# Patient Record
Sex: FEMALE | Race: WHITE | NOT HISPANIC OR LATINO | Employment: OTHER | URBAN - METROPOLITAN AREA
[De-identification: names, ages, dates, MRNs, and addresses within clinical notes are randomized per-mention and may not be internally consistent; named-entity substitution may affect disease eponyms.]

---

## 2019-04-11 ENCOUNTER — TRANSCRIBE ORDERS (OUTPATIENT)
Dept: NEUROSURGERY | Facility: CLINIC | Age: 59
End: 2019-04-11

## 2019-04-11 DIAGNOSIS — M54.50 LOWER BACK PAIN: Primary | ICD-10-CM

## 2019-04-18 ENCOUNTER — OFFICE VISIT (OUTPATIENT)
Dept: NEUROSURGERY | Facility: CLINIC | Age: 59
End: 2019-04-18
Payer: COMMERCIAL

## 2019-04-18 VITALS
SYSTOLIC BLOOD PRESSURE: 163 MMHG | HEART RATE: 84 BPM | BODY MASS INDEX: 25.91 KG/M2 | RESPIRATION RATE: 19 BRPM | HEIGHT: 68 IN | WEIGHT: 171 LBS | TEMPERATURE: 98 F | DIASTOLIC BLOOD PRESSURE: 94 MMHG

## 2019-04-18 DIAGNOSIS — M51.36 DEGENERATION OF INTERVERTEBRAL DISC OF LUMBAR REGION: Primary | ICD-10-CM

## 2019-04-18 DIAGNOSIS — M54.50 LOWER BACK PAIN: ICD-10-CM

## 2019-04-18 PROCEDURE — 99203 OFFICE O/P NEW LOW 30 MIN: CPT | Performed by: PHYSICIAN ASSISTANT

## 2019-04-18 RX ORDER — MULTIVIT WITH MINERALS/LUTEIN
TABLET ORAL DAILY
COMMUNITY

## 2019-04-18 RX ORDER — GABAPENTIN 300 MG/1
2 CAPSULE ORAL 3 TIMES DAILY PRN
COMMUNITY
Start: 2019-02-12

## 2019-04-18 RX ORDER — TIZANIDINE 4 MG/1
4 TABLET ORAL 2 TIMES DAILY
COMMUNITY
Start: 2019-03-25

## 2019-04-18 RX ORDER — NABUMETONE 500 MG/1
500 TABLET, FILM COATED ORAL
COMMUNITY
Start: 2019-03-07 | End: 2020-01-02 | Stop reason: ALTCHOICE

## 2019-04-18 RX ORDER — MONTELUKAST SODIUM 10 MG/1
10 TABLET ORAL
COMMUNITY
Start: 2018-09-13

## 2019-04-18 RX ORDER — ALBUTEROL SULFATE 2.5 MG/3ML
2.5 SOLUTION RESPIRATORY (INHALATION) 2 TIMES DAILY
COMMUNITY
Start: 2018-04-04

## 2019-04-18 RX ORDER — ESOMEPRAZOLE MAGNESIUM 40 MG/1
40 FOR SUSPENSION ORAL
COMMUNITY

## 2019-04-22 ENCOUNTER — TELEPHONE (OUTPATIENT)
Dept: OBGYN CLINIC | Facility: HOSPITAL | Age: 59
End: 2019-04-22

## 2019-07-03 ENCOUNTER — TRANSCRIBE ORDERS (OUTPATIENT)
Dept: PAIN MEDICINE | Facility: CLINIC | Age: 59
End: 2019-07-03

## 2019-07-03 ENCOUNTER — CONSULT (OUTPATIENT)
Dept: PAIN MEDICINE | Facility: CLINIC | Age: 59
End: 2019-07-03
Payer: COMMERCIAL

## 2019-07-03 VITALS
HEART RATE: 82 BPM | SYSTOLIC BLOOD PRESSURE: 152 MMHG | DIASTOLIC BLOOD PRESSURE: 74 MMHG | TEMPERATURE: 98.3 F | BODY MASS INDEX: 26.15 KG/M2 | WEIGHT: 172 LBS

## 2019-07-03 DIAGNOSIS — M51.16 INTERVERTEBRAL DISC DISORDER WITH RADICULOPATHY OF LUMBAR REGION: ICD-10-CM

## 2019-07-03 DIAGNOSIS — G89.4 CHRONIC PAIN SYNDROME: Primary | ICD-10-CM

## 2019-07-03 PROBLEM — F11.20 OPIOID DEPENDENCE, UNCOMPLICATED (HCC): Status: ACTIVE | Noted: 2017-08-21

## 2019-07-03 PROBLEM — F17.200 TOBACCO USE DISORDER: Status: ACTIVE | Noted: 2017-08-21

## 2019-07-03 PROBLEM — N18.30 KIDNEY DISEASE, CHRONIC, STAGE III (GFR 30-59 ML/MIN) (HCC): Status: ACTIVE | Noted: 2018-04-10

## 2019-07-03 PROBLEM — K21.9 GASTROESOPHAGEAL REFLUX DISEASE WITHOUT ESOPHAGITIS: Status: ACTIVE | Noted: 2017-08-21

## 2019-07-03 PROBLEM — J44.9 COPD, GROUP C, BY GOLD 2017 CLASSIFICATION (HCC): Status: ACTIVE | Noted: 2019-06-10

## 2019-07-03 PROCEDURE — 99204 OFFICE O/P NEW MOD 45 MIN: CPT | Performed by: ANESTHESIOLOGY

## 2019-07-03 NOTE — PROGRESS NOTES
Assessment  1  Chronic pain syndrome    2  Intervertebral disc disorder with radiculopathy of lumbar region        Plan  The patient's symptoms, history/physical are consistent with pain that is multifactorial in origin but predominantly result of a chronic pain syndrome secondary to her underlying lumbar disc bulges which are leading to radicular symptoms  At this time, I discussed spinal cord stimulation to treat her symptoms since she has exhausted all other nonsurgical treatment options  I explained how stimulation provides relief of lower back and leg symptoms by stimulating the dorsal columns  Initially a trial would be performed where a spinal cord stimulator lead would be placed percutaneously through an epidural needle  The patient would then have 3-6 days to evaluate whether the stimulator provides relief of the painful areas  If the patient gets significant relief of greater than 50% during the trial, the patient would then be referred for permanent implantation  In order to do the trial, the patient will be referred for psychological evaluation prior to obtaining insurance preauthorization  The patient will also be set up for education with our [employer] device representative  My impressions and treatment recommendations were discussed in detail with the patient who verbalized understanding and had no further questions  Discharge instructions were provided  I personally saw and examined the patient and I agree with the above discussed plan of care  Orders Placed This Encounter   Procedures    MRI thoracic spine wo contrast     Standing Status:   Future     Standing Expiration Date:   7/3/2023     Scheduling Instructions: There is no preparation for this test  Please leave your jewelry and valuables at home, wedding rings are the exception  Please bring your physician order, insurance cards, a form of photo ID and a list of your medications with you   Arrive 15 minutes prior to your appointment time in order to       register  Please bring any prior CT or MRI studies of this area that were not performed at a [facility]  To schedule this appointment, please contact Central Scheduling at [phone]  Order Specific Question:   What is the patient's sedation requirement? Answer:   No Sedation     Order Specific Question:   Does the patient have metallic implants? Answer:   No     Order Specific Question:   Is the patient pregnant? Answer:   No     No orders of the defined types were placed in this encounter  History of Present Illness    Asha Sloan is a 62 y o  female who presents for consultation in regards to lower back and right-sided buttock and leg pain  Symptoms have been present for 13 years without any precipitating injury or trauma  Pain is severe rated 8/10 on numeric rating scale and felt constantly  Symptoms are described to be dull, aching, shooting and burning in the lower back and travels down the right leg  She feels weakness of the leg  Symptoms are aggravated lying down, bending, sitting, exercise, relaxation  There is no change with coughing, sneezing  Treatment history has included numerous prior injections, physical therapy, exercise which have provided no relief  Heat/ice and use of a TENS unit provides moderate relief  She uses Tylenol as needed which provides minimal relief  I have personally reviewed and/or updated the patient's past medical history, past surgical history, family history, social history, current medications, allergies, and vital signs today  Review of Systems   Constitutional: Positive for unexpected weight change  Negative for fever  HENT: Positive for hearing loss  Negative for trouble swallowing  Eyes: Negative for visual disturbance  Respiratory: Positive for cough, shortness of breath and wheezing  Cardiovascular: Positive for palpitations  Negative for chest pain  Gastrointestinal: Negative for constipation, diarrhea, nausea and vomiting  Endocrine: Negative for cold intolerance, heat intolerance and polydipsia  Genitourinary: Positive for frequency  Negative for difficulty urinating  Musculoskeletal: Positive for joint swelling  Negative for arthralgias, gait problem and myalgias  Skin: Negative for rash  Neurological: Positive for numbness and headaches  Negative for dizziness, seizures, syncope and weakness  Hematological: Does not bruise/bleed easily  Psychiatric/Behavioral: Negative for dysphoric mood  All other systems reviewed and are negative        Patient Active Problem List   Diagnosis    COPD, group C, by GOLD 2017 classification ([facility] [address] )    Gastroesophageal reflux disease without esophagitis    Kidney disease, chronic, stage III (GFR 30-59 ml/min) ([facility])    Opioid dependence, uncomplicated ([facility])    Tobacco use disorder       Past Medical History:   Diagnosis Date    Asthma     COPD exacerbation ([facility])     GERD (gastroesophageal reflux disease)     Hiatal hernia     Kidney disease     Lumbosacral disc disease     Osteoarthritis of cervical and lumbar spine     Reactive airway disease     Tobacco use disorder        Past Surgical History:   Procedure Laterality Date    CHOLECYSTECTOMY      SIGMOIDOSCOPY      TONSILLECTOMY AND ADENOIDECTOMY         Family History   Problem Relation Age of Onset    Cancer Father     Heart disease Father     Ovarian cancer Sister     Diabetes Maternal Grandfather        Social History     Occupational History    Not on file   Tobacco Use    Smoking status: Current Some Day Smoker    Smokeless tobacco: Never Used   Substance and Sexual Activity    Alcohol use: Not Currently    Drug use: Not on file    Sexual activity: Not on file       Current Outpatient Medications on File Prior to Visit   Medication Sig    albuterol (2 5 mg/3 mL) 0 083 % nebulizer solution Inhale 2 5 mg    Ascorbic Acid (VITAMIN C) 1000 MG tablet Take by mouth    Buprenorphine HCl-Naloxone HCl (BUNAVAIL) 4 2-0 7 MG FILM TK 1 FILM BUCCALLY BID    esomeprazole (NEXIUM) 40 mg packet Take 40 mg by mouth    Fish Oil-Cholecalciferol (FISH OIL + D3) 4312-4842 MG-UNIT CAPS Take by mouth    fluticasone-salmeterol (ADVAIR DISKUS) 250-50 mcg/dose inhaler Inhale 1 puff    gabapentin (NEURONTIN) 300 mg capsule Take 2 capsules by mouth 3 (three) times a day    montelukast (SINGULAIR) 10 mg tablet Take 10 mg by mouth    nabumetone (RELAFEN) 500 mg tablet Take 500 mg by mouth    polyethylene glycol-propylene glycol (SYSTANE) 0 4-0 3 % Apply 0 05 mL to eye    tiZANidine (ZANAFLEX) 4 mg tablet Take 4 mg by mouth     No current facility-administered medications on file prior to visit  Allergies   Allergen Reactions    Shellfish Allergy Vomiting    Lactose Intolerance (Gi)     Penicillins Itching       Physical Exam    /74   Pulse 82   Temp 98 3 °F (36 8 °C) (Oral)   Wt 78 kg (172 lb)   BMI 26 15 kg/m²     Constitutional: normal, well developed, well nourished, alert, in no distress and non-toxic and no overt pain behavior  Eyes: anicteric  HEENT: grossly intact  Neck: supple, symmetric, trachea midline and no masses   Pulmonary:even and unlabored  Cardiovascular:No edema or pitting edema present  Skin:Normal without rashes or lesions and well hydrated  Psychiatric:Mood and affect appropriate  Neurologic:Cranial Nerves II-XII grossly intact  Musculoskeletal:antalgic     Lumbar Spine Exam  Appearance:  Normal lordosis  Palpation/Tenderness:  right lumbar paraspinal tenderness  right sacroiliac joint tenderness  right piriformis tenderness  Sensory:  no sensory deficits noted  Range of Motion:  Flexion:   Moderately limited  with pain  Extension:  Moderately limited  with pain  Lateral Flexion - Left:  No limitation  without pain  Lateral Flexion - Right:  Moderately limited  with pain  Rotation - Left:  No limitation without pain  Rotation - Right:  Moderately limited  with pain  Motor Strength:  Left hip flexion:  5/5  Left hip extension:  5/5  Right hip flexion:  5/5  Right hip extension:  5/5  Left knee flexion:  5/5  Left knee extension:  5/5  Right knee flexion:  5/5  Right knee extension:  5/5  Left foot dorsiflexion:  5/5  Left foot plantar flexion:  5/5  Right foot dorsiflexion:  5/5  Right foot plantar flexion:  5/5  Reflexes:  Left Patellar:  2+   Right Patellar:  2+   Left Achilles:  2+   Right Achilles:  2+   Special Tests:  Left Straight Leg Test:  negative  Right Straight Leg Test:  positive      Imaging    MRI L SPINE WO CONTRAST-12/24/2018    HISTORY  Radiculopathy    COMPARISON  MRI of the lumbar spine 08/20/2013    TECHNIQUE  Multiplanar, multisequence magnetic resonance imaging of the lumbar spine was performed without intravenous contrast     FINDINGS  There are 5 lumbar type vertebrae   Correlation with this numbering scheme is recommended prior to any planned surgical intervention   Lumbar vertebrae appear maintained in height   There is minimal retrolisthesis of L1 on L2 and L2 on L3   Mild leftward curvature is present   Multilevel loss of disc height and disc desiccation is demonstrated   Reactive marrow changes are most pronounced at L2-3   The distal spinal cord is unremarkable  T12-L1: No significant canal or foraminal narrowing  L1-L2: A broad-based disc bulge and mild facet arthropathy contribute to mild canal stenosis and mild bilateral foraminal narrowing  L2-L3: A broad-based disc bulge and facet arthropathy contribute to mild/moderate canal stenosis   There is mild/moderate left foraminal narrowing and moderate right foraminal narrowing  L3-L4: A broad-based disc bulge and facet arthropathy contribute to mild/moderate canal stenosis and moderate bilateral foraminal narrowing the left greater than right    L4-L5: A broad-based disc bulge and facet arthropathy contribute to mild/moderate canal stenosis, moderate/severe right foraminal narrowing and severe left foraminal narrowing  L5-S1: A broad-based disc bulge and facet arthropathy are present without significant canal stenosis   There is moderate/severe bilateral foraminal narrowing

## 2019-07-08 ENCOUNTER — TELEPHONE (OUTPATIENT)
Dept: PAIN MEDICINE | Facility: CLINIC | Age: 59
End: 2019-07-08

## 2019-07-08 DIAGNOSIS — G89.4 CHRONIC PAIN SYNDROME: Primary | ICD-10-CM

## 2019-07-08 NOTE — TELEPHONE ENCOUNTER
Pt to be a [employer] SCS Trial with Dr July Caldwell  Pt signed release of info  Pt received scripts for thoracic MRI and psych eval  Email sent to [employer] to educate patient  Once I have received psych eval I will start insurance auth process

## 2019-07-17 NOTE — TELEPHONE ENCOUNTER
Pt  Called stating she needs a referral to [facility] physiatrist       They would like this referral faxed to :  [phone]   For the srini conti

## 2019-07-18 NOTE — TELEPHONE ENCOUNTER
Script for psych eval faxed to [facility] ([phone]) as requested

## 2019-07-18 NOTE — TELEPHONE ENCOUNTER
Reviewed chart, could not locate Referral for Psych eval referenced 7/3/19 consult   Can referral be placed and it will be faxed

## 2019-07-18 NOTE — TELEPHONE ENCOUNTER
Dr Santosh Young, Can you please put order in for psych eval? There is not one in her chart   Thank you

## 2019-08-05 ENCOUNTER — TELEPHONE (OUTPATIENT)
Dept: PAIN MEDICINE | Facility: CLINIC | Age: 59
End: 2019-08-05

## 2019-08-05 NOTE — TELEPHONE ENCOUNTER
This message was sent in an Epic message in a stim message  Please forward records in regards to an injection as requested  Patient is calling to request our office to send notes about her pinched nerves to Dr Adam Pineda in [place]  Patient said that is her pain management doctor and that doctor can offer her an injection this Wednesday but he needs the notes first  Please advise?  [phone]

## 2019-08-05 NOTE — TELEPHONE ENCOUNTER
Patient is calling to request our office to send notes about her pinched nerves to Dr Kelly Carr in [place]  Patient said that is her pain management doctor and that doctor can offer her an injection this Wednesday but he needs the notes first  Please advise?  [phone]

## 2019-08-05 NOTE — TELEPHONE ENCOUNTER
The below message was forwarded to  [place] to address  This is not anything to do with the STIM

## 2019-08-05 NOTE — TELEPHONE ENCOUNTER
Spoke with pt who reports having psych eval done last week at Dr Nani Calles office by Ira Machado ([phone])  Advised patient that I have not received eval as of yet but that I would call and request eval be sent ASAP

## 2019-08-05 NOTE — TELEPHONE ENCOUNTER
Pt would call back from Han Vanessa   Pt would like to know if everything needed was received for the trial      Pt can be reached at [phone]

## 2019-08-06 ENCOUNTER — TELEPHONE (OUTPATIENT)
Dept: PAIN MEDICINE | Facility: CLINIC | Age: 59
End: 2019-08-06

## 2019-08-08 NOTE — TELEPHONE ENCOUNTER
Signed off psych eval and all clinical info faxed to [employer]  Spoke with pt and made her aware of status

## 2019-08-20 NOTE — TELEPHONE ENCOUNTER
Received authorization, Auth # [id number], valid 8/16/19-2/16/2020    Spoke with patient and she is scheduled as follows:  9/17/19 @ 1300 w/ [facility] for H&P  9/24/19 arriving @ 1000 for 1100 trial (pt aware to arrive @ 1000)  10/1/19 @ 1415 w/ Yadira Terry for lead removal  PCN allergy  Pt denies any blood thinning meds/NSAIDs  Email sent to [employer] to inform them of trial dates

## 2019-09-05 DIAGNOSIS — Z79.899 ENCOUNTER FOR LONG-TERM CURRENT USE OF MEDICATION: ICD-10-CM

## 2019-09-05 DIAGNOSIS — G89.4 CHRONIC PAIN SYNDROME: Primary | ICD-10-CM

## 2019-09-05 DIAGNOSIS — Z79.01 CHRONIC ANTICOAGULATION: ICD-10-CM

## 2019-09-05 DIAGNOSIS — M51.16 INTERVERTEBRAL DISC DISORDER WITH RADICULOPATHY OF LUMBAR REGION: ICD-10-CM

## 2019-09-17 ENCOUNTER — OFFICE VISIT (OUTPATIENT)
Dept: PAIN MEDICINE | Facility: CLINIC | Age: 59
End: 2019-09-17
Payer: COMMERCIAL

## 2019-09-17 VITALS
DIASTOLIC BLOOD PRESSURE: 82 MMHG | RESPIRATION RATE: 16 BRPM | HEIGHT: 68 IN | BODY MASS INDEX: 26.07 KG/M2 | SYSTOLIC BLOOD PRESSURE: 156 MMHG | WEIGHT: 172 LBS

## 2019-09-17 DIAGNOSIS — M51.16 INTERVERTEBRAL DISC DISORDERS WITH RADICULOPATHY, LUMBAR REGION: ICD-10-CM

## 2019-09-17 DIAGNOSIS — G89.4 CHRONIC PAIN SYNDROME: Primary | ICD-10-CM

## 2019-09-17 PROCEDURE — 99213 OFFICE O/P EST LOW 20 MIN: CPT | Performed by: NURSE PRACTITIONER

## 2019-09-17 RX ORDER — PREGABALIN 75 MG/1
75 CAPSULE ORAL 2 TIMES DAILY
COMMUNITY
Start: 2019-08-08

## 2019-09-17 NOTE — PROGRESS NOTES
Pt c/o lower back pain that radiates to the right hip and leg    Assessment:  No diagnosis found  Plan:  ***    {Oral Swab Statement:91031}    {Opioid Statement:29836}    {UDS Statement:31405}    {PDMP Statement:11199}    {Pain Management Procedure Statement:00813}    My impressions and treatment recommendations were discussed in detail with the patient who verbalized understanding and had no further questions  Discharge instructions were provided  I personally saw and examined the patient and I agree with the above discussed plan of care  No orders of the defined types were placed in this encounter  No orders of the defined types were placed in this encounter  History of Present Illness:  Yesica Day is a 61 y o  female who presents for a follow up office visit in regards to Back Pain (radaites to the right hip and leg); Hip Pain; and Leg Pain  The patients current symptoms include ***    {SPA Pain Assessment:87733}    {Opioid Contract DVKW:63928}    I have personally reviewed and/or updated the patient's past medical history, past surgical history, family history, social history, current medications, allergies, and vital signs today  Review of Systems   Respiratory: Negative for shortness of breath  Cardiovascular: Negative for chest pain  Gastrointestinal: Negative for constipation, diarrhea, nausea and vomiting  Musculoskeletal: Positive for gait problem  Negative for arthralgias, joint swelling and myalgias  Decreased ROM   Skin: Negative for rash  Neurological: Positive for weakness  Negative for dizziness and seizures  All other systems reviewed and are negative        Patient Active Problem List   Diagnosis    COPD, group C, by GOLD 2017 classification ([facility] [address] )    Gastroesophageal reflux disease without esophagitis    Kidney disease, chronic, stage III (GFR 30-59 ml/min) ([facility])    Opioid dependence, uncomplicated ([address] )    Tobacco use disorder       Past Medical History:   Diagnosis Date    Asthma     COPD exacerbation (HCC)     GERD (gastroesophageal reflux disease)     Hiatal hernia     Kidney disease     Lumbosacral disc disease     Osteoarthritis of cervical and lumbar spine     Reactive airway disease     Tobacco use disorder        Past Surgical History:   Procedure Laterality Date    CHOLECYSTECTOMY      SIGMOIDOSCOPY      TONSILLECTOMY AND ADENOIDECTOMY         Family History   Problem Relation Age of Onset    Cancer Father     Heart disease Father     Ovarian cancer Sister     Diabetes Maternal Grandfather        Social History     Occupational History    Not on file   Tobacco Use    Smoking status: Current Some Day Smoker    Smokeless tobacco: Never Used   Substance and Sexual Activity    Alcohol use: Not Currently    Drug use: Not on file    Sexual activity: Not on file       Current Outpatient Medications on File Prior to Visit   Medication Sig    albuterol (2 5 mg/3 mL) 0 083 % nebulizer solution Inhale 2 5 mg    Ascorbic Acid (VITAMIN C) 1000 MG tablet Take by mouth    Buprenorphine HCl-Naloxone HCl (BUNAVAIL) 4 2-0 7 MG FILM TK 1 FILM BUCCALLY BID    esomeprazole (NEXIUM) 40 mg packet Take 40 mg by mouth    Fish Oil-Cholecalciferol (FISH OIL + D3) 6137-4643 MG-UNIT CAPS Take by mouth    fluticasone-salmeterol (ADVAIR DISKUS) 250-50 mcg/dose inhaler Inhale 1 puff    gabapentin (NEURONTIN) 300 mg capsule Take 2 capsules by mouth 3 (three) times a day    montelukast (SINGULAIR) 10 mg tablet Take 10 mg by mouth    nabumetone (RELAFEN) 500 mg tablet Take 500 mg by mouth    polyethylene glycol-propylene glycol (SYSTANE) 0 4-0 3 % Apply 0 05 mL to eye    tiZANidine (ZANAFLEX) 4 mg tablet Take 4 mg by mouth     No current facility-administered medications on file prior to visit          Allergies   Allergen Reactions    Shellfish Allergy Vomiting    Lactose Intolerance (Gi)     Penicillins Itching       Physical Exam:    There were no vitals taken for this visit  Constitutional:{General Appearance:19200::"normal, well developed, well nourished, alert, in no distress and non-toxic and no overt pain behavior  "}  Eyes:{Sclera:50610::"anicteric"}  HEENT:{Hearin::"grossly intact"}  Neck:{Neck:57081::"supple, symmetric, trachea midline and no masses "}  Pulmonary:{Respiratory effort:52535::"even and unlabored"}  Cardiovascular:{Examination of Extremities:27915::"No edema or pitting edema present"}  Skin:{Skin and Subcutaneous tissues:30337::"Normal without rashes or lesions and well hydrated"}  Psychiatric:{Mood and Affect:62458::"Mood and affect appropriate"}  Neurologic:{Cranial Nerves:91171::"Cranial Nerves II-XII grossly intact"}  Musculoskeletal:{[place]:73186::"normal"}    Imaging

## 2019-09-17 NOTE — PROGRESS NOTES
Assessment:  1  Chronic pain syndrome     2  Intervertebral disc disorders with radiculopathy, lumbar region         Plan:  The spinal cord stimulator trial was discussed in depth with the patient  The patient was advised that a stimulator lead will be placed percutaneously through an epidural needle, with intra-op stimulation done to confirm appropriate lead placement  The lead will then be connected to an external generator and secured to the skin  The device representative will then program the stimulator and explain how to use it  During the trial, the patient was instructed to perform their activities of daily living, but limit twisting and bending motions, and no lifting greater than 10 pounds  The patient was advised to refrain from tub baths, showers, swimming, and hot tubs during the trial  The patient will return to the office for trial evaluation and lead removal on 10/01/2019  At that time, if the trial is successful, the patient will be referred to Neurosurgery for permanent spinal cord stimulator placement  Pre-procedure instructions were reviewed with the patient  The patient was given a prescription for blood work to be done by 09/17/2019  Complete risks and benefits including bleeding, infection, headache, tissue reaction, nerve injury and allergic reaction were discussed  The approach was demonstrated using models and literature was provided  Verbal and written consent was obtained  The patient will next follow- up on 09/24/2019 for the stimulator trial     History of Present Illness: The patient is a 61 y o  female scheduled for an [employer] spinal cord stimulator trial on 9/24/2019 to treat chronic pain from lumbar intervertebral disc disorder radiculopathy  The current pain pattern includes right-sided low back pain that radiates into her right buttocks and right hip  She denies bilateral leg weakness, or bowel or bladder issues    She describes her pain as dull aching, sharp, throbbing, shooting pain that is constant nature with symptoms worsening during the morning nighttime hours  The patient reports that her pain and symptoms are unchanged since last office visit  I have personally reviewed and/or updated the patient's past medical history, past surgical history, family history, social history, current medications, allergies, and vital signs today  Review of Systems  Review of Systems   Respiratory: Negative for shortness of breath  Cardiovascular: Negative for chest pain  Gastrointestinal: Negative for constipation, diarrhea, nausea and vomiting  Musculoskeletal: Positive for gait problem  Negative for arthralgias, joint swelling and myalgias  Decreased ROM  Joint stiffness   Skin: Negative for rash  Neurological: Positive for weakness  Negative for dizziness and seizures  All other systems reviewed and are negative           Past Medical History:   Diagnosis Date    Asthma     COPD exacerbation (HCC)     GERD (gastroesophageal reflux disease)     Hiatal hernia     Kidney disease     Lumbosacral disc disease     Osteoarthritis of cervical and lumbar spine     Reactive airway disease     Tobacco use disorder        Past Surgical History:   Procedure Laterality Date    CHOLECYSTECTOMY      SIGMOIDOSCOPY      TONSILLECTOMY AND ADENOIDECTOMY         Family History   Problem Relation Age of Onset    Cancer Father     Heart disease Father     Ovarian cancer Sister     Diabetes Maternal Grandfather        Social History     Occupational History    Not on file   Tobacco Use    Smoking status: Current Some Day Smoker    Smokeless tobacco: Never Used   Substance and Sexual Activity    Alcohol use: Not Currently    Drug use: Not on file    Sexual activity: Not on file         Current Outpatient Medications:     albuterol (2 5 mg/3 mL) 0 083 % nebulizer solution, Inhale 2 5 mg, Disp: , Rfl:     Ascorbic Acid (VITAMIN C) 1000 MG tablet, Take by mouth, Disp: , Rfl:     Buprenorphine HCl-Naloxone HCl (BUNAVAIL) 4 2-0 7 MG FILM, TK 1 FILM BUCCALLY BID, Disp: , Rfl:     esomeprazole (NEXIUM) 40 mg packet, Take 40 mg by mouth, Disp: , Rfl:     Fish Oil-Cholecalciferol (FISH OIL + D3) 8471-9108 MG-UNIT CAPS, Take by mouth, Disp: , Rfl:     fluticasone-salmeterol (ADVAIR DISKUS) 250-50 mcg/dose inhaler, Inhale 1 puff, Disp: , Rfl:     gabapentin (NEURONTIN) 300 mg capsule, Take 2 capsules by mouth 3 (three) times a day, Disp: , Rfl:     montelukast (SINGULAIR) 10 mg tablet, Take 10 mg by mouth, Disp: , Rfl:     nabumetone (RELAFEN) 500 mg tablet, Take 500 mg by mouth, Disp: , Rfl:     polyethylene glycol-propylene glycol (SYSTANE) 0 4-0 3 %, Apply 0 05 mL to eye, Disp: , Rfl:     pregabalin (LYRICA) 75 mg capsule, Take 1 cap at night for a week  Then take 1 cap twice daily as tolerated  , Disp: , Rfl:     tiZANidine (ZANAFLEX) 4 mg tablet, Take 4 mg by mouth, Disp: , Rfl:     Allergies   Allergen Reactions    Shellfish Allergy Vomiting    Lactose Intolerance (Gi)     Penicillins Itching       Physical Exam:    /82 (BP Location: Right arm, Patient Position: Sitting, Cuff Size: Standard)   Resp 16   Ht 5' 8" (1 727 m)   Wt 78 kg (172 lb)   BMI 26 15 kg/m²     Constitutional:normal, well developed, well nourished, alert, in no distress and non-toxic and no overt pain behavior  and overweight  Eyes:anicteric  HEENT:grossly intact  Neck:supple, symmetric, trachea midline and no masses   Pulmonary:even and unlabored the lungs clear to auscultation bilaterally  Cardiovascular:No edema or pitting edema present and Regular rate and rhythm, no murmurs rubs or gallops noted  Skin:Normal without rashes or lesions and well hydrated  Psychiatric:Mood and affect appropriate  Neurologic:Cranial Nerves II-XII grossly intact  Musculoskeletal:normal   Abdomen:   Bowel sounds present x4 quadrants no pain with palpation of the abdomen    Imaging      No orders of the defined types were placed in this encounter

## 2019-09-18 LAB — HBA1C MFR BLD HPLC: 5.3 %

## 2019-09-18 NOTE — TELEPHONE ENCOUNTER
FYI: patient will be having blood work done at [facility] today, 9/18/19

## 2019-09-20 ENCOUNTER — TELEPHONE (OUTPATIENT)
Dept: RADIOLOGY | Facility: CLINIC | Age: 59
End: 2019-09-20

## 2019-09-20 DIAGNOSIS — D72.828 OTHER ELEVATED WHITE BLOOD CELL (WBC) COUNT: Primary | ICD-10-CM

## 2019-09-20 NOTE — TELEPHONE ENCOUNTER
I called pt this AM to ask where she had her labs done for SCS next wk  Pt said [facility]  I obtained labs  Pt's WBC was 10 90, results shown to FQ  Pt was to be called and inquire if sick and ordered to have blood test repeated on Sunday  I s/w pt and she said she started with a cold yest, congestion and hoarseness  She said she was taking OTC med for it  I made FQ, SCS Trial to be cx'd  I informed pt of the same and explained it is not safe to perform procedure if pt has active infection  Pt upset  Pt will contact her PCP if OTC doesn't help  Pt told to contact the office once symptoms are gone and when off abx if one is started  Pt asking if it will take another month to get this scheduled, I told pt we will do our best to get her R/S once she is no longer sick  Email sent to [employer] and Elisabet Calvert

## 2019-09-23 RX ORDER — CLINDAMYCIN PHOSPHATE 900 MG/50ML
900 INJECTION INTRAVENOUS ONCE
Status: CANCELLED | OUTPATIENT
Start: 2019-09-23 | End: 2019-09-23

## 2019-09-23 NOTE — TELEPHONE ENCOUNTER
Patient's trial and lead removal were cancelled due to blood work being abnormal  Spoke with patient and she is re scheduled as follows:  10/22/19 arriving @ 1000 for 1100 trial  10/28/19 @ [phone] w/ Reba Valdez for lead removal  New pre op instruction sheet and script for CBC mailed to patient  Email sent to [employer] to inform them of new trial dates

## 2019-10-16 ENCOUNTER — TELEPHONE (OUTPATIENT)
Dept: PAIN MEDICINE | Facility: MEDICAL CENTER | Age: 59
End: 2019-10-16

## 2019-10-16 NOTE — TELEPHONE ENCOUNTER
Pt called stating that she needs to know if she needs to get the white blood cell count lab work done  Pt never received a call back regarding this   Pt is not sure if she can have the procedure done next week if she does not get a response    Pt can be reached at [phone]

## 2019-10-16 NOTE — TELEPHONE ENCOUNTER
Late entry:    S/w pt this morning around 10 am to ask if she repeated her lab work for her SCS trial on 10/22  Pt said she has not she has been putting it off because she had a tooth ache last week and ended up getting put on abx then from the abx she developed a UTI or yeast infection  Pt said she did not get it checked but has been using OTC meds but the burning has not improved  Told pt that I will discuss with FQ and call her back

## 2019-10-16 NOTE — TELEPHONE ENCOUNTER
Made pt aware of having to cancel, pt upset and wants to reschedule asap  [facility]

## 2019-10-17 NOTE — TELEPHONE ENCOUNTER
Trial and lead removal have been re scheduled  Email sent to Thayer Frankel from [facility] to inform him of dates   Pt will have new blood work done a week prior to 11/7/19

## 2019-10-22 ENCOUNTER — HOSPITAL ENCOUNTER (OUTPATIENT)
Dept: RADIOLOGY | Facility: CLINIC | Age: 59
Discharge: HOME | End: 2019-10-22

## 2019-11-04 ENCOUNTER — TELEPHONE (OUTPATIENT)
Dept: RADIOLOGY | Facility: CLINIC | Age: 59
End: 2019-11-04

## 2019-11-04 NOTE — TELEPHONE ENCOUNTER
I called [facility] this morning to inquire on any recent CBC for pt, was informed that the last test pt had done was a urine test     I called and s/w pt about going for repeat blood test before SCS trial on Thurs  Pt said she was just heading out the door to go get it done  I confirmed that pt has no current active infections  Will contact [facility] later today or tomorrow for CBC results    [facility] ph # [phone]

## 2019-11-04 NOTE — TELEPHONE ENCOUNTER
I called [facility] and labs from today are still pending  She will put note in system to fax results to [phone]     (Pt is for SCS Trial this Thurs 11/7, FQ needs to review results, last WBC was elevated )

## 2019-11-05 ENCOUNTER — TRANSCRIBE ORDERS (OUTPATIENT)
Dept: PAIN MEDICINE | Facility: CLINIC | Age: 59
End: 2019-11-05

## 2019-11-07 ENCOUNTER — HOSPITAL ENCOUNTER (OUTPATIENT)
Dept: RADIOLOGY | Facility: CLINIC | Age: 59
Discharge: HOME | End: 2019-11-07
Attending: ANESTHESIOLOGY | Admitting: ANESTHESIOLOGY
Payer: COMMERCIAL

## 2019-11-07 ENCOUNTER — TELEPHONE (OUTPATIENT)
Dept: RADIOLOGY | Facility: CLINIC | Age: 59
End: 2019-11-07

## 2019-11-07 VITALS
RESPIRATION RATE: 20 BRPM | OXYGEN SATURATION: 93 % | HEART RATE: 71 BPM | DIASTOLIC BLOOD PRESSURE: 86 MMHG | TEMPERATURE: 98.1 F | SYSTOLIC BLOOD PRESSURE: 143 MMHG

## 2019-11-07 DIAGNOSIS — M51.16 INTERVERTEBRAL DISC DISORDER WITH RADICULOPATHY OF LUMBAR REGION: ICD-10-CM

## 2019-11-07 DIAGNOSIS — G89.4 CHRONIC PAIN SYNDROME: ICD-10-CM

## 2019-11-07 PROCEDURE — 63650 IMPLANT NEUROELECTRODES: CPT | Performed by: ANESTHESIOLOGY

## 2019-11-07 PROCEDURE — 95972 ALYS CPLX SP/PN NPGT W/PRGRM: CPT | Performed by: ANESTHESIOLOGY

## 2019-11-07 PROCEDURE — 96374 THER/PROPH/DIAG INJ IV PUSH: CPT

## 2019-11-07 PROCEDURE — C1787 PATIENT PROGR, NEUROSTIM: HCPCS

## 2019-11-07 PROCEDURE — C1897 LEAD, NEUROSTIM TEST KIT: HCPCS

## 2019-11-07 RX ORDER — CLINDAMYCIN PHOSPHATE 600 MG/50ML
600 INJECTION INTRAVENOUS ONCE
Status: COMPLETED | OUTPATIENT
Start: 2019-11-07 | End: 2019-11-07

## 2019-11-07 RX ORDER — CIPROFLOXACIN 500 MG/1
500 TABLET, FILM COATED ORAL EVERY 12 HOURS SCHEDULED
Qty: 14 TABLET | Refills: 0 | Status: SHIPPED | OUTPATIENT
Start: 2019-11-07 | End: 2019-11-13

## 2019-11-07 RX ADMIN — CLINDAMYCIN PHOSPHATE 600 MG: 600 INJECTION INTRAVENOUS at 10:28

## 2019-11-07 NOTE — H&P
History of Present Illness:  The patient is a 61 y o  female who presents with complaints of lower back and leg pain secondary to lumbar degenerative disc disease with radiculopathy and is here today for a [employer] spinal cord stimulator trial     Patient Active Problem List   Diagnosis    COPD, group C, by GOLD 2017 classification ([facility] [address] )    Gastroesophageal reflux disease without esophagitis    Kidney disease, chronic, stage III (GFR 30-59 ml/min) ([facility])    Opioid dependence, uncomplicated ([facility] [address] )    Tobacco use disorder       Past Medical History:   Diagnosis Date    Asthma     [facility]     GERD (gastroesophageal reflux disease)     Hiatal hernia     Kidney disease     Lumbosacral disc disease     Osteoarthritis of cervical and lumbar spine     Reactive airway disease     Tobacco use disorder        Past Surgical History:   Procedure Laterality Date    CHOLECYSTECTOMY      SIGMOIDOSCOPY      TONSILLECTOMY AND ADENOIDECTOMY           Current Outpatient Medications:     albuterol (2 5 mg/3 mL) 0 083 % nebulizer solution, Inhale 2 5 mg, Disp: , Rfl:     Ascorbic Acid (VITAMIN C) 1000 MG tablet, Take by mouth, Disp: , Rfl:     Buprenorphine HCl-Naloxone HCl (BUNAVAIL) 4 2-0 7 MG FILM, TK 1 FILM BUCCALLY BID, Disp: , Rfl:     esomeprazole (NEXIUM) 40 mg packet, Take 40 mg by mouth, Disp: , Rfl:     Fish Oil-Cholecalciferol (FISH OIL + D3) 7478-2309 MG-UNIT CAPS, Take by mouth, Disp: , Rfl:     fluticasone-salmeterol (ADVAIR DISKUS) 250-50 mcg/dose inhaler, Inhale 1 puff, Disp: , Rfl:     gabapentin (NEURONTIN) 300 mg capsule, Take 2 capsules by mouth 3 (three) times a day, Disp: , Rfl:     montelukast (SINGULAIR) 10 mg tablet, Take 10 mg by mouth, Disp: , Rfl:     nabumetone (RELAFEN) 500 mg tablet, Take 500 mg by mouth, Disp: , Rfl:     polyethylene glycol-propylene glycol (SYSTANE) 0 4-0 3 %, Apply 0 05 mL to eye, Disp: , Rfl:     pregabalin (LYRICA) 75 mg capsule, Take 1 cap at night for a week  Then take 1 cap twice daily as tolerated  , Disp: , Rfl:     tiZANidine (ZANAFLEX) 4 mg tablet, Take 4 mg by mouth, Disp: , Rfl:     Current Facility-Administered Medications:     lidocaine-epinephrine (XYLOCAINE-MPF/EPINEPHRINE) 1%-1:200,000 injection 30 mL, 30 mL, Infiltration, Once, Ang Harding MD    Allergies   Allergen Reactions    Shellfish Allergy Vomiting    Lactose Intolerance (Gi)     Penicillins Itching       Physical Exam:   Vitals:    11/07/19 1002   BP: 136/86   Pulse: 70   Resp: 18   Temp: 98 1 °F (36 7 °C)   SpO2: 93%     General: Awake, Alert, Oriented x 3, Mood and affect appropriate  Respiratory: Respirations even and unlabored  Cardiovascular: Peripheral pulses intact; no edema  Musculoskeletal Exam:   Lower back tenderness    ASA Score: 2    Patient/Chart Verification  Patient ID Verified: Verbal  ID Band Applied: No  Consents Confirmed: Procedural, To be obtained in the Pre-Procedure area  H&P( within 30 days) Verified: To be obtained in the Pre-Procedure area  Interval H&P(within 24 hr) Complete (required for Outpatients and Surgery Admit only): To be obtained in the Pre-Procedure area  Allergies Reviewed: Yes  Anticoag/NSAID held?: Yes(Numbatone for a week)  Currently on antibiotics?: No  Pregnancy denied?: NA    Assessment:   1  Chronic pain syndrome    2   Intervertebral disc disorder with radiculopathy of lumbar region        Plan: [employer] SCS Trial

## 2019-11-07 NOTE — DISCHARGE INSTR - LAB
SPINE AND PAIN: SPINAL CORD STIMULATION/PERIPHERAL NERVE STIMULATION TRIAL/ PERMANENT IMPLANT DISCHARGE INSTRUCTIONS    ACTIVITY RESTRICTIONS DURING TRIAL PERIOD  · Do not drive with the SCS "on"  · Do not bend or twist at the waist   · Do not lift anything that weighs over 5 pounds  · When you lie down, "log roll" (keep spine aligned) to change positions  Do not sleep on your stomach  · Limit stair climbing and strenuous activity  CARE OF THE INJECTION SITE  · CHECK THE DRESSING DAILY  It should intact  · Notify the [facility] if you have any of the following: redness, drainage, swelling, chills or fever over 100°F   · Do not change dressing, only reinforce edges if necessary  · Keep the dressing dry  Do not shower, (sponge baths only) until evaluated in office  SPECIAL INSTRUCTIONS  · Take your temperature daily  · Follow-up for lead removal scheduled for ***  · The  box is expensive  DO NOT drop or get wet  · Do not pull on the cable or leads  Do not disconnect the cable and lead  · Do activities that normally cause you to have pain and try different  settings  MEDICATIONS  · Continue to take all routine medications  · Our office may have instructed you to hold some medications  · Take antiobiotic as prescribed:_____________________________________  If you have any problems specifically related to your procedure, please call our office at [phone]  Problems not related to your procedure should be directed at your primary care physician  Contact the company representative with any questions regarding settings or operation of the external  box

## 2019-11-07 NOTE — TELEPHONE ENCOUNTER
I s/w Adarsh aMrin the pharmacist and informed him that FQ prescribed the pt cipro for 1 wk and she was advised to hold her tizanidine while taking the cipro for 1 wk  Pharmacist appreciated the call

## 2019-11-08 NOTE — TELEPHONE ENCOUNTER
Pt reports she had a lot of pain yest after the procedure and had to take Tylenol  She said she had a rough night trying to find a way to get comfortable in bed  She said it is helping already, she only has a little bit of pain and is walking around  Pt said she is so happy!   Her drsg is dry and she's had to reinforce two edges with more tape  No redness, swelling or drainage  Pt is taking her abx, and has no fevers  I confirmed that the pt is not taking her tizanidine while taking the cipro  I confirmed lead removal green 11/13, arriving at 1pm  Pt said she called Jessica with a question last night  Pt told one of our providers would be on call after office closes today and through the wknd if she needs to call with any issues

## 2019-11-13 ENCOUNTER — OFFICE VISIT (OUTPATIENT)
Dept: PAIN MEDICINE | Facility: CLINIC | Age: 59
End: 2019-11-13

## 2019-11-13 VITALS
SYSTOLIC BLOOD PRESSURE: 166 MMHG | HEART RATE: 80 BPM | TEMPERATURE: 98.5 F | WEIGHT: 172 LBS | BODY MASS INDEX: 26.15 KG/M2 | DIASTOLIC BLOOD PRESSURE: 87 MMHG

## 2019-11-13 DIAGNOSIS — M51.16 INTERVERTEBRAL DISC DISORDER WITH RADICULOPATHY OF LUMBAR REGION: ICD-10-CM

## 2019-11-13 DIAGNOSIS — G89.4 CHRONIC PAIN SYNDROME: Primary | ICD-10-CM

## 2019-11-13 PROCEDURE — 99024 POSTOP FOLLOW-UP VISIT: CPT | Performed by: NURSE PRACTITIONER

## 2019-11-13 NOTE — PROGRESS NOTES
Assessment  1  Chronic pain syndrome     2  Intervertebral disc disorder with radiculopathy of lumbar region         Plan  The patient had a successful [employer] spinal cord stimulator trial   The patient will be referred to [facility] for permanent implantation  The patient will then follow back in our office 6 weeks after permanent implantation for re-evaluation  Patient was instructed to continue with her antibiotics as prescribed  she was instructed to call the office with any signs of infection such as fever, chills or fatigue  Patient was also instructed to monitor the insertion site for any signs infection such as drainage, redness, or warmth and pain at the site  Patient was instructed to call the office with any of these findings  Patient was instructed that she may shower  she was instructed to keep a band aid over the insertion site during showering, and replace with clean, dry band aid after showering  Patient was instructed to avoid submerging in any hot tubs, bath tubs or swimming pools until insertion site is fully healed  No orders of the defined types were placed in this encounter  History of Present Illness  The patient is a 61 y o  female status post [employer] spinal cord stimulator percutaneous lead placement on 11/07/2019   The patient's reported an overall reduction in pain of 90% during the trial   The patient reported functional improvement included decrease right low back and right hip pain  She reported increased ability to stand and walk for longer periods of time with the trial  She is also reporting great mobility with decrease pain  She is looking forward to permanent implantation of the spinal cord stimulator  She is currently rating her pain as 3/10 numeric pain scale  Patient reports that she is not experiencing any signs of infection such as fever, chills or fatigue  Patient reports that she is taking her antibiotics as prescribed   she denies any drainage, redness, or increased pain at the insertion site  Pain Assessment Measures   Numeric Rating Scale 3   Oswestry Disability Index Score/Neck Disability Index Score 26   PROMIS-29   - Physical Function 14   - Anxiety 4   Depression 4   - Fatigue 6   - Sleep Disturbance 14   - Ability to Participate in Social Roles And Activities 14   - Pain Interference 12     Review of Systems   Respiratory: Negative for shortness of breath  Cardiovascular: Negative for chest pain  Gastrointestinal: Negative for constipation, diarrhea, nausea and vomiting  Musculoskeletal: Positive for gait problem and joint swelling  Negative for arthralgias and myalgias  Skin: Negative for rash  Neurological: Positive for weakness  Negative for dizziness and seizures  All other systems reviewed and are negative        Patient Active Problem List   Diagnosis    COPD, group C, by GOLD 2017 classification ([facility] [address] [facility]    Gastroesophageal reflux disease without esophagitis    Kidney disease, chronic, stage III (GFR 30-59 ml/min) [facility]    Opioid dependence, uncomplicated ([facility]    Tobacco use disorder    Chronic pain syndrome    Intervertebral disc disorder with radiculopathy of lumbar region        Past Medical History:   Diagnosis Date    Asthma     COPD exacerbation [facility]     GERD (gastroesophageal reflux disease)     Hiatal hernia     Kidney disease     Lumbosacral disc disease     Osteoarthritis of cervical and lumbar spine     Reactive airway disease     Tobacco use disorder        Past Surgical History:   Procedure Laterality Date    CHOLECYSTECTOMY      SIGMOIDOSCOPY      TONSILLECTOMY AND ADENOIDECTOMY         Family History   Problem Relation Age of Onset    Cancer Father     Heart disease Father     Ovarian cancer Sister     Diabetes Maternal Grandfather        Social History     Occupational History    Not on file   Tobacco Use    Smoking status: Current Some Day Smoker    Smokeless tobacco: Never Used Substance and Sexual Activity    Alcohol use: Not Currently    Drug use: Not on file    Sexual activity: Not on file         Current Outpatient Medications:     albuterol (2 5 mg/3 mL) 0 083 % nebulizer solution, Inhale 2 5 mg, Disp: , Rfl:     Ascorbic Acid (VITAMIN C) 1000 MG tablet, Take by mouth, Disp: , Rfl:     Buprenorphine HCl-Naloxone HCl (BUNAVAIL) 4 2-0 7 MG FILM, TK 1 FILM BUCCALLY BID, Disp: , Rfl:     esomeprazole (NEXIUM) 40 mg packet, Take 40 mg by mouth, Disp: , Rfl:     Fish Oil-Cholecalciferol (FISH OIL + D3) 6072-6231 MG-UNIT CAPS, Take by mouth, Disp: , Rfl:     fluticasone-salmeterol (ADVAIR DISKUS) 250-50 mcg/dose inhaler, Inhale 1 puff, Disp: , Rfl:     gabapentin (NEURONTIN) 300 mg capsule, Take 2 capsules by mouth 3 (three) times a day, Disp: , Rfl:     montelukast (SINGULAIR) 10 mg tablet, Take 10 mg by mouth, Disp: , Rfl:     nabumetone (RELAFEN) 500 mg tablet, Take 500 mg by mouth, Disp: , Rfl:     polyethylene glycol-propylene glycol (SYSTANE) 0 4-0 3 %, Apply 0 05 mL to eye, Disp: , Rfl:     pregabalin (LYRICA) 75 mg capsule, Take 1 cap at night for a week  Then take 1 cap twice daily as tolerated  , Disp: , Rfl:     tiZANidine (ZANAFLEX) 4 mg tablet, Take 4 mg by mouth, Disp: , Rfl:     Allergies   Allergen Reactions    Shellfish Allergy Vomiting    Lactose Intolerance (Gi)     Penicillins Itching         Physical Exam    /87   Pulse 80   Temp 98 5 °F (36 9 °C) (Oral)   Wt 78 kg (172 lb)   BMI 26 15 kg/m²     Thoracic Spine:  Spinal cord stimulator lead removed with tip intact; no erythema, tenderness or signs of infection; area cleansed with alcohol and bandage placed

## 2019-12-09 ENCOUNTER — OFFICE VISIT (OUTPATIENT)
Dept: NEUROSURGERY | Facility: CLINIC | Age: 59
End: 2019-12-09
Payer: COMMERCIAL

## 2019-12-09 VITALS
WEIGHT: 171 LBS | DIASTOLIC BLOOD PRESSURE: 86 MMHG | SYSTOLIC BLOOD PRESSURE: 150 MMHG | TEMPERATURE: 98.7 F | HEIGHT: 68 IN | RESPIRATION RATE: 18 BRPM | BODY MASS INDEX: 25.91 KG/M2

## 2019-12-09 DIAGNOSIS — M54.16 LUMBAR RADICULOPATHY: ICD-10-CM

## 2019-12-09 DIAGNOSIS — G89.4 CHRONIC PAIN SYNDROME: Primary | ICD-10-CM

## 2019-12-09 DIAGNOSIS — M51.16 INTERVERTEBRAL DISC DISORDER WITH RADICULOPATHY OF LUMBAR REGION: ICD-10-CM

## 2019-12-09 PROCEDURE — 99215 OFFICE O/P EST HI 40 MIN: CPT | Performed by: NEUROLOGICAL SURGERY

## 2019-12-09 RX ORDER — CHLORHEXIDINE GLUCONATE 0.12 MG/ML
15 RINSE ORAL ONCE
Status: CANCELLED | OUTPATIENT
Start: 2019-12-09 | End: 2019-12-09

## 2019-12-09 NOTE — H&P (VIEW-ONLY)
Assessment/Plan:    No problem-specific Assessment & Plan notes found for this encounter  Patient is stable  Symptoms, as detailed in HPI, continue to significantly impact of patient's quality of life in daily activities  After carefully considering presentation, investigations, functional status and co-morbidities, the risk/benefit profile of surgical intervention is favorable  History, physical examination and diagnostic tests were reviewed and questions answered  Diagnosis, care plan and treatment options were discussed  The patient understand instructions and will follow up as directed  Patient with chronic right leg pain with successful medtronic stimulator trial with 90% relief of pain  Recommend percutaneous implant with a right sided generator  She agreed to proceed    Expected postoperative course, including activity restrictions, expected pain and postoperative medication were reviewed  Patient provided verbal consent to surgical procedure and signed consent form: Yes    We also discussed the risks and benefits of the procedure the risks being including: infection (~2%), neurologic injury (<1%), new pain, revisions surgery, failure to relieve pain, hardware issues, CSF leak  The benefits including relief of pain as in trial  The patient stated understanding of the risks and benefits and agreed to proceed           IMAGING REVIEWED: MRI thoracic shows no significant stenosis, MR lumbar shows diffuse moderate disease with moderate multilevel stenosis, no severe stenosis     Diagnoses and all orders for this visit:    Chronic pain syndrome  -     Ambulatory referral to Neurosurgery  -     Case request operating room: INSERTION THORACIC DORSAL COLUMN SPINAL CORD STIMULATOR PERCUTANEOUS, RIGHT GENERATOR; Standing  -     Case request operating room: Bisi Graham 13 PERCUTANEOUS, RIGHT GENERATOR    Lumbar radiculopathy  -     Case request operating room: INSERTION THORACIC DORSAL COLUMN SPINAL CORD STIMULATOR PERCUTANEOUS, RIGHT GENERATOR; Standing  -     Case request operating room: INSERTION THORACIC DORSAL COLUMN SPINAL CORD STIMULATOR PERCUTANEOUS, RIGHT GENERATOR    Intervertebral disc disorder with radiculopathy of lumbar region  -     Ambulatory referral to Neurosurgery    Other orders  -     Diet NPO; Sips with meds; Standing  -     Nursing Communication [facility] Interventions Implemented; Standing  -     Nursing Communication Use 2 CHG cloths, have staff wash the entire body (neck down) ; Standing  -     Nursing Communication Swab both nares with Povidone-Iodine solution, EXCLUDE if patient has shellfish/Iodine allergy; Standing  -     chlorhexidine (PERIDEX) 0 12 % oral rinse 15 mL  -     Void on call to OR; Standing  -     Insert peripheral IV; Standing  -     vancomycin (VANCOCIN) 1,000 mg in sodium chloride 0 9 % 500 mL IVPB          I have spent 40minutes with Patient  today in which greater than 50% of this time was spent in counseling/coordination of care regarding Diagnostic results, Prognosis, Risks and benefits of tx options, Intructions for management, Patient and family education, Importance of tx compliance, Risk factor reductions and Impressions  Subjective:      Patient ID: Juju Flores is a 61 y o  female  Patient is a 61year old female with symptoms of right lower leg pain  Pain is severe and constant in quality  Pain distribution is right lower leg  Pain is worse with activity  Pain is improved by recent stimulation trial  The pain has been present for several years  Pain has associated disability and distress  The patient underwent a successful medtronic thoracic stimulator trial with 90 % relief of pain  They reported improvement in activity level  They are ready to proceed with surgery         The following portions of the patient's history were reviewed and updated as appropriate:   She  has a past medical history of Asthma, COPD exacerbation ([place] [address] ), GERD (gastroesophageal reflux disease), Hiatal hernia, Kidney disease, Lumbosacral disc disease, Osteoarthritis of cervical and lumbar spine, Reactive airway disease, and Tobacco use disorder  She   Patient Active Problem List    Diagnosis Date Noted    Chronic pain syndrome     Intervertebral disc disorder with radiculopathy of lumbar region     COPD, group C, by GOLD 2017 classification ([place] [address] ) 06/10/2019    Kidney disease, chronic, stage III (GFR 30-59 ml/min) ([facility]) 04/10/2018    Gastroesophageal reflux disease without esophagitis 08/21/2017    Opioid dependence, uncomplicated ([place] [address] ) 20/41/8020    Tobacco use disorder 08/21/2017     She  has a past surgical history that includes Cholecystectomy; Tonsillectomy and adenoidectomy; and Sigmoidoscopy  Her family history includes Cancer in her father; Diabetes in her maternal grandfather; Heart disease in her father; Ovarian cancer in her sister  She  reports that she has been smoking  She has never used smokeless tobacco  She reports that she drank alcohol  Her drug history is not on file    Current Outpatient Medications   Medication Sig Dispense Refill    albuterol (2 5 mg/3 mL) 0 083 % nebulizer solution Inhale 2 5 mg      Ascorbic Acid (VITAMIN C) 1000 MG tablet Take by mouth      Buprenorphine HCl-Naloxone HCl (BUNAVAIL) 4 2-0 7 MG FILM TK 1 FILM BUCCALLY BID      esomeprazole (NEXIUM) 40 mg packet Take 40 mg by mouth      Fish Oil-Cholecalciferol (FISH OIL + D3) 6403-3544 MG-UNIT CAPS Take by mouth      fluticasone-salmeterol (ADVAIR DISKUS) 250-50 mcg/dose inhaler Inhale 1 puff      gabapentin (NEURONTIN) 300 mg capsule Take 2 capsules by mouth 3 (three) times a day      montelukast (SINGULAIR) 10 mg tablet Take 10 mg by mouth      polyethylene glycol-propylene glycol (SYSTANE) 0 4-0 3 % Apply 0 05 mL to eye      pregabalin (LYRICA) 75 mg capsule 2 (two) times a day       tiZANidine (ZANAFLEX) 4 mg tablet Take 4 mg by mouth  nabumetone (RELAFEN) 500 mg tablet Take 500 mg by mouth       No current facility-administered medications for this visit  Current Outpatient Medications on File Prior to Visit   Medication Sig    albuterol (2 5 mg/3 mL) 0 083 % nebulizer solution Inhale 2 5 mg    Ascorbic Acid (VITAMIN C) 1000 MG tablet Take by mouth    Buprenorphine HCl-Naloxone HCl (BUNAVAIL) 4 2-0 7 MG FILM TK 1 FILM BUCCALLY BID    esomeprazole (NEXIUM) 40 mg packet Take 40 mg by mouth    Fish Oil-Cholecalciferol (FISH OIL + D3) 3394-0490 MG-UNIT CAPS Take by mouth    fluticasone-salmeterol (ADVAIR DISKUS) 250-50 mcg/dose inhaler Inhale 1 puff    gabapentin (NEURONTIN) 300 mg capsule Take 2 capsules by mouth 3 (three) times a day    montelukast (SINGULAIR) 10 mg tablet Take 10 mg by mouth    polyethylene glycol-propylene glycol (SYSTANE) 0 4-0 3 % Apply 0 05 mL to eye    pregabalin (LYRICA) 75 mg capsule 2 (two) times a day     tiZANidine (ZANAFLEX) 4 mg tablet Take 4 mg by mouth    nabumetone (RELAFEN) 500 mg tablet Take 500 mg by mouth     No current facility-administered medications on file prior to visit  She is allergic to shellfish allergy; lactose intolerance (gi); and penicillins       Review of Systems   HENT: Positive for trouble swallowing (Hx of stretching)  Eyes: Negative  Respiratory: Positive for shortness of breath  Negative for wheezing  COPD  asthma   Cardiovascular: Negative  Gastrointestinal: Negative  Negative for abdominal pain and nausea  Musculoskeletal: Positive for arthralgias, back pain (right side with LBP into right hip wraps into groin, with pain traveling to right outer side to the knee) and gait problem  Negative for neck pain and neck stiffness  Neurological: Positive for weakness (right leg)         I have personally reviewed all aspects of the review of systems as documented    Objective:    /86 (BP Location: Left arm)   Temp 98 7 °F (37 1 °C) (Tympanic) Resp 18   Ht 5' 8" (1 727 m)   Wt 77 6 kg (171 lb)   BMI 26 00 kg/m²      Physical Exam   Constitutional: She is oriented to person, place, and time  She appears well-developed and well-nourished  No distress  HENT:   Head: Normocephalic and atraumatic  Nose: Nose normal    Eyes: Pupils are equal, round, and reactive to light  Conjunctivae and EOM are normal  Right eye exhibits no discharge  Left eye exhibits no discharge  No scleral icterus  Neck: Normal range of motion  No JVD present  No tracheal deviation present  No thyromegaly present  Cardiovascular: Normal rate  Pulmonary/Chest: Effort normal and breath sounds normal  No stridor  No respiratory distress  Abdominal: Soft  Musculoskeletal: Normal range of motion  She exhibits no edema, tenderness or deformity  Neurological: She is alert and oriented to person, place, and time  She has normal strength and normal reflexes  No cranial nerve deficit or sensory deficit  Skin: Skin is warm and dry  No rash noted  She is not diaphoretic  No erythema  No pallor  Psychiatric: She has a normal mood and affect   Her behavior is normal  Judgment and thought content normal

## 2019-12-09 NOTE — PROGRESS NOTES
Assessment/Plan:    No problem-specific Assessment & Plan notes found for this encounter  Patient is stable  Symptoms, as detailed in HPI, continue to significantly impact of patient's quality of life in daily activities  After carefully considering presentation, investigations, functional status and co-morbidities, the risk/benefit profile of surgical intervention is favorable  History, physical examination and diagnostic tests were reviewed and questions answered  Diagnosis, care plan and treatment options were discussed  The patient understand instructions and will follow up as directed  Patient with chronic right leg pain with successful medtronic stimulator trial with 90% relief of pain  Recommend percutaneous implant with a right sided generator  She agreed to proceed    Expected postoperative course, including activity restrictions, expected pain and postoperative medication were reviewed  Patient provided verbal consent to surgical procedure and signed consent form: Yes    We also discussed the risks and benefits of the procedure the risks being including: infection (~2%), neurologic injury (<1%), new pain, revisions surgery, failure to relieve pain, hardware issues, CSF leak  The benefits including relief of pain as in trial  The patient stated understanding of the risks and benefits and agreed to proceed           IMAGING REVIEWED: MRI thoracic shows no significant stenosis, MR lumbar shows diffuse moderate disease with moderate multilevel stenosis, no severe stenosis     Diagnoses and all orders for this visit:    Chronic pain syndrome  -     Ambulatory referral to Neurosurgery  -     Case request operating room: INSERTION THORACIC DORSAL COLUMN SPINAL CORD STIMULATOR PERCUTANEOUS, RIGHT GENERATOR; Standing  -     Case request operating room: Bisi Graham 13 PERCUTANEOUS, RIGHT GENERATOR    Lumbar radiculopathy  -     Case request operating room: INSERTION THORACIC DORSAL COLUMN SPINAL CORD STIMULATOR PERCUTANEOUS, RIGHT GENERATOR; Standing  -     Case request operating room: INSERTION THORACIC DORSAL COLUMN SPINAL CORD STIMULATOR PERCUTANEOUS, RIGHT GENERATOR    Intervertebral disc disorder with radiculopathy of lumbar region  -     Ambulatory referral to Neurosurgery    Other orders  -     Diet NPO; Sips with meds; Standing  -     Nursing Communication [facility] Interventions Implemented; Standing  -     Nursing Communication Use 2 CHG cloths, have staff wash the entire body (neck down) ; Standing  -     Nursing Communication Swab both nares with Povidone-Iodine solution, EXCLUDE if patient has shellfish/Iodine allergy; Standing  -     chlorhexidine (PERIDEX) 0 12 % oral rinse 15 mL  -     Void on call to OR; Standing  -     Insert peripheral IV; Standing  -     vancomycin (VANCOCIN) 1,000 mg in sodium chloride 0 9 % 500 mL IVPB          I have spent 40minutes with Patient  today in which greater than 50% of this time was spent in counseling/coordination of care regarding Diagnostic results, Prognosis, Risks and benefits of tx options, Intructions for management, Patient and family education, Importance of tx compliance, Risk factor reductions and Impressions  Subjective:      Patient ID: Michael Asif is a 61 y o  female  Patient is a 61year old female with symptoms of right lower leg pain  Pain is severe and constant in quality  Pain distribution is right lower leg  Pain is worse with activity  Pain is improved by recent stimulation trial  The pain has been present for several years  Pain has associated disability and distress  The patient underwent a successful medtronic thoracic stimulator trial with 90 % relief of pain  They reported improvement in activity level  They are ready to proceed with surgery         The following portions of the patient's history were reviewed and updated as appropriate:   She  has a past medical history of Asthma, COPD exacerbation ([place] [address] ), GERD (gastroesophageal reflux disease), Hiatal hernia, Kidney disease, Lumbosacral disc disease, Osteoarthritis of cervical and lumbar spine, Reactive airway disease, and Tobacco use disorder  She   Patient Active Problem List    Diagnosis Date Noted    Chronic pain syndrome     Intervertebral disc disorder with radiculopathy of lumbar region     COPD, group C, by GOLD 2017 classification ([place] [address] ) 06/10/2019    Kidney disease, chronic, stage III (GFR 30-59 ml/min) ([facility]) 04/10/2018    Gastroesophageal reflux disease without esophagitis 08/21/2017    Opioid dependence, uncomplicated ([place] [address] ) 70/57/1277    Tobacco use disorder 08/21/2017     She  has a past surgical history that includes Cholecystectomy; Tonsillectomy and adenoidectomy; and Sigmoidoscopy  Her family history includes Cancer in her father; Diabetes in her maternal grandfather; Heart disease in her father; Ovarian cancer in her sister  She  reports that she has been smoking  She has never used smokeless tobacco  She reports that she drank alcohol  Her drug history is not on file    Current Outpatient Medications   Medication Sig Dispense Refill    albuterol (2 5 mg/3 mL) 0 083 % nebulizer solution Inhale 2 5 mg      Ascorbic Acid (VITAMIN C) 1000 MG tablet Take by mouth      Buprenorphine HCl-Naloxone HCl (BUNAVAIL) 4 2-0 7 MG FILM TK 1 FILM BUCCALLY BID      esomeprazole (NEXIUM) 40 mg packet Take 40 mg by mouth      Fish Oil-Cholecalciferol (FISH OIL + D3) 3869-3606 MG-UNIT CAPS Take by mouth      fluticasone-salmeterol (ADVAIR DISKUS) 250-50 mcg/dose inhaler Inhale 1 puff      gabapentin (NEURONTIN) 300 mg capsule Take 2 capsules by mouth 3 (three) times a day      montelukast (SINGULAIR) 10 mg tablet Take 10 mg by mouth      polyethylene glycol-propylene glycol (SYSTANE) 0 4-0 3 % Apply 0 05 mL to eye      pregabalin (LYRICA) 75 mg capsule 2 (two) times a day       tiZANidine (ZANAFLEX) 4 mg tablet Take 4 mg by mouth  nabumetone (RELAFEN) 500 mg tablet Take 500 mg by mouth       No current facility-administered medications for this visit  Current Outpatient Medications on File Prior to Visit   Medication Sig    albuterol (2 5 mg/3 mL) 0 083 % nebulizer solution Inhale 2 5 mg    Ascorbic Acid (VITAMIN C) 1000 MG tablet Take by mouth    Buprenorphine HCl-Naloxone HCl (BUNAVAIL) 4 2-0 7 MG FILM TK 1 FILM BUCCALLY BID    esomeprazole (NEXIUM) 40 mg packet Take 40 mg by mouth    Fish Oil-Cholecalciferol (FISH OIL + D3) 2517-6460 MG-UNIT CAPS Take by mouth    fluticasone-salmeterol (ADVAIR DISKUS) 250-50 mcg/dose inhaler Inhale 1 puff    gabapentin (NEURONTIN) 300 mg capsule Take 2 capsules by mouth 3 (three) times a day    montelukast (SINGULAIR) 10 mg tablet Take 10 mg by mouth    polyethylene glycol-propylene glycol (SYSTANE) 0 4-0 3 % Apply 0 05 mL to eye    pregabalin (LYRICA) 75 mg capsule 2 (two) times a day     tiZANidine (ZANAFLEX) 4 mg tablet Take 4 mg by mouth    nabumetone (RELAFEN) 500 mg tablet Take 500 mg by mouth     No current facility-administered medications on file prior to visit  She is allergic to shellfish allergy; lactose intolerance (gi); and penicillins       Review of Systems   HENT: Positive for trouble swallowing (Hx of stretching)  Eyes: Negative  Respiratory: Positive for shortness of breath  Negative for wheezing  COPD  asthma   Cardiovascular: Negative  Gastrointestinal: Negative  Negative for abdominal pain and nausea  Musculoskeletal: Positive for arthralgias, back pain (right side with LBP into right hip wraps into groin, with pain traveling to right outer side to the knee) and gait problem  Negative for neck pain and neck stiffness  Neurological: Positive for weakness (right leg)         I have personally reviewed all aspects of the review of systems as documented    Objective:    /86 (BP Location: Left arm)   Temp 98 7 °F (37 1 °C) (Tympanic) Resp 18   Ht 5' 8" (1 727 m)   Wt 77 6 kg (171 lb)   BMI 26 00 kg/m²      Physical Exam   Constitutional: She is oriented to person, place, and time  She appears well-developed and well-nourished  No distress  HENT:   Head: Normocephalic and atraumatic  Nose: Nose normal    Eyes: Pupils are equal, round, and reactive to light  Conjunctivae and EOM are normal  Right eye exhibits no discharge  Left eye exhibits no discharge  No scleral icterus  Neck: Normal range of motion  No JVD present  No tracheal deviation present  No thyromegaly present  Cardiovascular: Normal rate  Pulmonary/Chest: Effort normal and breath sounds normal  No stridor  No respiratory distress  Abdominal: Soft  Musculoskeletal: Normal range of motion  She exhibits no edema, tenderness or deformity  Neurological: She is alert and oriented to person, place, and time  She has normal strength and normal reflexes  No cranial nerve deficit or sensory deficit  Skin: Skin is warm and dry  No rash noted  She is not diaphoretic  No erythema  No pallor  Psychiatric: She has a normal mood and affect   Her behavior is normal  Judgment and thought content normal

## 2019-12-09 NOTE — LETTER
December 9, 2019     MEJIA Trevino  [address]  [address] [address]    Patient: Sari Homans   YOB: 1960   Date of Visit: 12/9/2019       Dear Dr Medardo Hernandez: Thank you for referring Sari Homans to me for evaluation  Below are my notes for this consultation  If you have questions, please do not hesitate to call me  I look forward to following your patient along with you  Sincerely,        Jaziel Griffith MD        CC: No Recipients  Jaziel Griffith MD  12/9/2019  1:51 PM  Sign at close encounter  Assessment/Plan:    No problem-specific Assessment & Plan notes found for this encounter  Patient is stable  Symptoms, as detailed in HPI, continue to significantly impact of patient's quality of life in daily activities  After carefully considering presentation, investigations, functional status and co-morbidities, the risk/benefit profile of surgical intervention is favorable  History, physical examination and diagnostic tests were reviewed and questions answered  Diagnosis, care plan and treatment options were discussed  The patient understand instructions and will follow up as directed  Patient with chronic right leg pain with successful medtronic stimulator trial with 90% relief of pain  Recommend percutaneous implant with a right sided generator  She agreed to proceed    Expected postoperative course, including activity restrictions, expected pain and postoperative medication were reviewed  Patient provided verbal consent to surgical procedure and signed consent form: Yes    We also discussed the risks and benefits of the procedure the risks being including: infection (~2%), neurologic injury (<1%), new pain, revisions surgery, failure to relieve pain, hardware issues, CSF leak  The benefits including relief of pain as in trial  The patient stated understanding of the risks and benefits and agreed to proceed           IMAGING REVIEWED: MRI thoracic shows no significant stenosis, MR lumbar shows diffuse moderate disease with moderate multilevel stenosis, no severe stenosis     Diagnoses and all orders for this visit:    Chronic pain syndrome  -     Ambulatory referral to Neurosurgery  -     Case request operating room: INSERTION THORACIC DORSAL COLUMN SPINAL CORD STIMULATOR PERCUTANEOUS, RIGHT GENERATOR; Standing  -     Case request operating room: [address], RIGHT GENERATOR    Lumbar radiculopathy  -     Case request operating room: INSERTION THORACIC DORSAL COLUMN SPINAL CORD STIMULATOR PERCUTANEOUS, RIGHT GENERATOR; Standing  -     Case request operating room: INSERTION THORACIC DORSAL COLUMN SPINAL CORD STIMULATOR PERCUTANEOUS, RIGHT GENERATOR    Intervertebral disc disorder with radiculopathy of lumbar region  -     Ambulatory referral to Neurosurgery    Other orders  -     Diet NPO; Sips with meds; Standing  -     Nursing Communication [facility] Interventions Implemented; Standing  -     Nursing Communication Use 2 CHG cloths, have staff wash the entire body (neck down) ; Standing  -     Nursing Communication Swab both nares with Povidone-Iodine solution, EXCLUDE if patient has shellfish/Iodine allergy; Standing  -     chlorhexidine (PERIDEX) 0 12 % oral rinse 15 mL  -     Void on call to OR; Standing  -     Insert peripheral IV; Standing  -     vancomycin (VANCOCIN) 1,000 mg in sodium chloride 0 9 % 500 mL IVPB          I have spent 40minutes with Patient  today in which greater than 50% of this time was spent in counseling/coordination of care regarding Diagnostic results, Prognosis, Risks and benefits of tx options, Intructions for management, Patient and family education, Importance of tx compliance, Risk factor reductions and Impressions  Subjective:      Patient ID: Chidi Sultana is a 61 y o  female  Patient is a 61year old female with symptoms of right lower leg pain   Pain is severe and constant in quality  Pain distribution is right lower leg  Pain is worse with activity  Pain is improved by recent stimulation trial  The pain has been present for several years  Pain has associated disability and distress  The patient underwent a successful medtronic thoracic stimulator trial with 90 % relief of pain  They reported improvement in activity level  They are ready to proceed with surgery  The following portions of the patient's history were reviewed and updated as appropriate:   She  has a past medical history of Asthma, COPD exacerbation ([place] [address] ), GERD (gastroesophageal reflux disease), Hiatal hernia, Kidney disease, Lumbosacral disc disease, Osteoarthritis of cervical and lumbar spine, Reactive airway disease, and Tobacco use disorder  She   Patient Active Problem List    Diagnosis Date Noted    Chronic pain syndrome     Intervertebral disc disorder with radiculopathy of lumbar region     COPD, group C, by GOLD 2017 classification ([place] [address] ) 06/10/2019    Kidney disease, chronic, stage III (GFR 30-59 ml/min) ([facility]) 04/10/2018    Gastroesophageal reflux disease without esophagitis 08/21/2017    Opioid dependence, uncomplicated ([place] [address] ) 13/81/1057    Tobacco use disorder 08/21/2017     She  has a past surgical history that includes Cholecystectomy; Tonsillectomy and adenoidectomy; and Sigmoidoscopy  Her family history includes Cancer in her father; Diabetes in her maternal grandfather; Heart disease in her father; Ovarian cancer in her sister  She  reports that she has been smoking  She has never used smokeless tobacco  She reports that she drank alcohol  Her drug history is not on file    Current Outpatient Medications   Medication Sig Dispense Refill    albuterol (2 5 mg/3 mL) 0 083 % nebulizer solution Inhale 2 5 mg      Ascorbic Acid (VITAMIN C) 1000 MG tablet Take by mouth      Buprenorphine HCl-Naloxone HCl (BUNAVAIL) 4 2-0 7 MG FILM TK 1 FILM BUCCALLY BID      esomeprazole (NEXIUM) 40 mg packet Take 40 mg by mouth      Fish Oil-Cholecalciferol (FISH OIL + D3) 3592-0197 MG-UNIT CAPS Take by mouth      fluticasone-salmeterol (ADVAIR DISKUS) 250-50 mcg/dose inhaler Inhale 1 puff      gabapentin (NEURONTIN) 300 mg capsule Take 2 capsules by mouth 3 (three) times a day      montelukast (SINGULAIR) 10 mg tablet Take 10 mg by mouth      polyethylene glycol-propylene glycol (SYSTANE) 0 4-0 3 % Apply 0 05 mL to eye      pregabalin (LYRICA) 75 mg capsule 2 (two) times a day       tiZANidine (ZANAFLEX) 4 mg tablet Take 4 mg by mouth      nabumetone (RELAFEN) 500 mg tablet Take 500 mg by mouth       No current facility-administered medications for this visit  Current Outpatient Medications on File Prior to Visit   Medication Sig    albuterol (2 5 mg/3 mL) 0 083 % nebulizer solution Inhale 2 5 mg    Ascorbic Acid (VITAMIN C) 1000 MG tablet Take by mouth    Buprenorphine HCl-Naloxone HCl (BUNAVAIL) 4 2-0 7 MG FILM TK 1 FILM BUCCALLY BID    esomeprazole (NEXIUM) 40 mg packet Take 40 mg by mouth    Fish Oil-Cholecalciferol (FISH OIL + D3) 4064-2574 MG-UNIT CAPS Take by mouth    fluticasone-salmeterol (ADVAIR DISKUS) 250-50 mcg/dose inhaler Inhale 1 puff    gabapentin (NEURONTIN) 300 mg capsule Take 2 capsules by mouth 3 (three) times a day    montelukast (SINGULAIR) 10 mg tablet Take 10 mg by mouth    polyethylene glycol-propylene glycol (SYSTANE) 0 4-0 3 % Apply 0 05 mL to eye    pregabalin (LYRICA) 75 mg capsule 2 (two) times a day     tiZANidine (ZANAFLEX) 4 mg tablet Take 4 mg by mouth    nabumetone (RELAFEN) 500 mg tablet Take 500 mg by mouth     No current facility-administered medications on file prior to visit  She is allergic to shellfish allergy; lactose intolerance (gi); and penicillins       Review of Systems   HENT: Positive for trouble swallowing (Hx of stretching)  Eyes: Negative  Respiratory: Positive for shortness of breath  Negative for wheezing  COPD  asthma   Cardiovascular: Negative  Gastrointestinal: Negative  Negative for abdominal pain and nausea  Musculoskeletal: Positive for arthralgias, back pain (right side with LBP into right hip wraps into groin, with pain traveling to right outer side to the knee) and gait problem  Negative for neck pain and neck stiffness  Neurological: Positive for weakness (right leg)  I have personally reviewed all aspects of the review of systems as documented    Objective:    /86 (BP Location: Left arm)   Temp 98 7 °F (37 1 °C) (Tympanic)   Resp 18   Ht 5' 8" (1 727 m)   Wt 77 6 kg (171 lb)   BMI 26 00 kg/m²       Physical Exam   Constitutional: She is oriented to person, place, and time  She appears well-developed and well-nourished  No distress  HENT:   Head: Normocephalic and atraumatic  Nose: Nose normal    Eyes: Pupils are equal, round, and reactive to light  Conjunctivae and EOM are normal  Right eye exhibits no discharge  Left eye exhibits no discharge  No scleral icterus  Neck: Normal range of motion  No JVD present  No tracheal deviation present  No thyromegaly present  Cardiovascular: Normal rate  Pulmonary/Chest: Effort normal and breath sounds normal  No stridor  No respiratory distress  Abdominal: Soft  Musculoskeletal: Normal range of motion  She exhibits no edema, tenderness or deformity  Neurological: She is alert and oriented to person, place, and time  She has normal strength and normal reflexes  No cranial nerve deficit or sensory deficit  Skin: Skin is warm and dry  No rash noted  She is not diaphoretic  No erythema  No pallor  Psychiatric: She has a normal mood and affect   Her behavior is normal  Judgment and thought content normal

## 2019-12-13 ENCOUNTER — TELEPHONE (OUTPATIENT)
Dept: NEUROSURGERY | Facility: CLINIC | Age: 59
End: 2019-12-13

## 2019-12-13 NOTE — TELEPHONE ENCOUNTER
Operative Procedure INSERTION THORACIC DORSAL COLUMN SPINAL CORD STIMULATOR PERCUTANEOUS, RIGHT GENERATOR (Right Spine Thoracic)---1/3/2019   [employer]     Assessment for comorbid medical conditions:   HO adverse response to general anesthesia:DENIES   Surgical Procedures past 6 months:--SCS TRIAL DR Asher Baumgarten     Infection (MRSA ESBL  CDIFF ):DENIES   Autoimmune Disease:denies   Cardiac:denies   Pulmonary:smoker 15 pack year,    1 ppd x 15 yers   Use albuterol daily 1 time  Advair routine  No cpap /PEGGY  NO O2 use ,    Endocrine: denies DM and hypothyroidism      MISC/Oncology/hematology: denies     Skin intact//GI- (urostomy, colostomy, ileostomy, intermittent catheterization):--denies      Personal history of venous thromboembolic disease: denies     Imagining: brought [facility] today for uploading must verify, 7/10/2019 images viewed in PACS MRI T/S      Pain management:    Hold Anticoagulant agents pre and postoperative (AC, Antiplatelet, ASA, NSAID, vitamins , dietary supplements , OTC) reviewed in detail vitamins dietary supplements  REFALEN /nabumetone  NSAID  Provided overview of surgical process from office appointment thru 6 weeks post op:    Patient verbalized understanding, all questions were answered and contact information    [facility] EVALUATION (SCS OR DBS) ET COMPLETE MOCA during office visit

## 2019-12-31 LAB — HBA1C MFR BLD HPLC: 5.4 %

## 2020-01-02 ENCOUNTER — TELEPHONE (OUTPATIENT)
Dept: NEUROSURGERY | Facility: CLINIC | Age: 60
End: 2020-01-02

## 2020-01-02 ENCOUNTER — DOCUMENTATION (OUTPATIENT)
Dept: NEUROSURGERY | Facility: CLINIC | Age: 60
End: 2020-01-02

## 2020-01-02 DIAGNOSIS — G89.18 POSTOPERATIVE PAIN: Primary | ICD-10-CM

## 2020-01-02 DIAGNOSIS — Z98.890 POSTOPERATIVE STATE: ICD-10-CM

## 2020-01-02 RX ORDER — OXYCODONE HYDROCHLORIDE AND ACETAMINOPHEN 5; 325 MG/1; MG/1
1 TABLET ORAL EVERY 4 HOURS PRN
Qty: 30 TABLET | Refills: 0 | Status: SHIPPED | OUTPATIENT
Start: 2020-01-02

## 2020-01-02 RX ORDER — CLINDAMYCIN HYDROCHLORIDE 300 MG/1
CAPSULE ORAL
Qty: 18 CAPSULE | Refills: 0 | Status: SHIPPED | OUTPATIENT
Start: 2020-01-03 | End: 2020-01-06

## 2020-01-02 RX ORDER — BUPRENORPHINE HYDROCHLORIDE AND NALOXONE HYDROCHLORIDE DIHYDRATE 2; .5 MG/1; MG/1
4.2 TABLET SUBLINGUAL 2 TIMES DAILY
COMMUNITY
End: 2020-01-02

## 2020-01-02 NOTE — TELEPHONE ENCOUNTER
Pre operative call day prior surgery scheduled in the AM w/ Dr Jordi Marshall Procedure: [place], RIGHT GENERATOR (Right Spine Thoracic)--  Discussion/Review    Allergies ---Reviewed   Hold medications --- Reviewed as per hold list   NPO after MN, night prior surgery ---Reviewed as per instructions from  ASU nurse   Medication (s) instructed by healthcare provider to take the morning of surgery w/ sip of water 4 OZ---as per instructions from  ASU nurse  discussed:Reviewed as per instructions from  [address] [place] [address] operative scripts electronic transmission: clindamycin x 3 days   PDMP site reviewed accessed and reviewed scheduled drug list printed and scanned into record--done   Pain management script:Buprenorphine HCl-Naloxone HCl (BUNAVAIL) --currently prescribed   Did not order opiate pain medication ---need to discuss with patient -----  Pre- operative shower protocol reviewed; Clarify instructions as per protocol, third chlorhexidine shower tonight before surgery, then use KARLA wipes as per packaging instructions, Use a clean towel and wash cloth starting tonight and continue nightly until seen 2 weeks post operative visit for incision check removal  Change bed linens tonight and continue at least 1-2 times weekly  --reinforced   Informed will receive a telephone call tonight from a hospital representative with time to report on surgery day:pending call    Informed will receive a f/u call within in 24 -48 hours post-op to assess recovery reinforce instructions, and to answer any questions    ----reinforced   Follow-up appointments reviewed ---1/20 2 weeks    2/24 6 week     Patient verbalized understanding information provided /discussed

## 2020-01-02 NOTE — TELEPHONE ENCOUNTER
Called patient again to discuss Bunavail use and ordering opiate for postoperative pain management  [address] [facility] in [facility] [phone] or [phone]  DR Monica Kinsey    She reports the clinic is aware she is having surgery and advised her of need to stop Bunavail , cannot take with postoperative opiate pain med    She reports her lats dose as 1700 today , she will    Not take morning dose and remain off medication for time she is taking opiate      Script to pharmacy for percocet  Explained neurosurgery opiate prescribing policy again for acute postoperative pain management      Patient verbalized and understanding of conversation

## 2020-01-02 NOTE — PRE-PROCEDURE INSTRUCTIONS
Pre-Surgery Instructions:   Medication Instructions    albuterol (2 5 mg/3 mL) 0 083 % nebulizer solution Instructed patient per Anesthesia Guidelines   Ascorbic Acid (VITAMIN C) 1000 MG tablet Instructed patient per Anesthesia Guidelines   Buprenorphine HCl-Naloxone HCl (BUNAVAIL) 4 2-0 7 MG FILM Instructed patient per Anesthesia Guidelines   esomeprazole (NEXIUM) 40 mg packet Instructed patient per Anesthesia Guidelines   Fish Oil-Cholecalciferol (FISH OIL + D3) 4852-2593 MG-UNIT CAPS Patient was instructed by Physician and understands   fluticasone-salmeterol (ADVAIR DISKUS) 250-50 mcg/dose inhaler Instructed patient per Anesthesia Guidelines   gabapentin (NEURONTIN) 300 mg capsule Instructed patient per Anesthesia Guidelines   montelukast (SINGULAIR) 10 mg tablet Instructed patient per Anesthesia Guidelines   polyethylene glycol-propylene glycol (SYSTANE) 0 4-0 3 % Instructed patient per Anesthesia Guidelines   pregabalin (LYRICA) 75 mg capsule Instructed patient per Anesthesia Guidelines   tiotropium (SPIRIVA RESPIMAT) 2 5 MCG/ACT AERS inhaler Instructed patient per Anesthesia Guidelines   tiZANidine (ZANAFLEX) 4 mg tablet Instructed patient per Anesthesia Guidelines  Before your operation, you play an important role in decreasing your risk for infection by washing with special antiseptic soap  This is an effective way to reduce bacteria on the skin which may help to prevent infections at the surgical site  Please read the following directions in advance  1  In the week before your operation purchase a 4 ounce bottle of antiseptic soap containing chlorhexidine gluconate 4%  Some brand names include: Aplicare, Endure, and Hibiclens  The cost is usually less than $5 00  · For your convenience, the [address] [place] [address] carries the soap  · It may also be available at your doctor's office or pre-admission testing center, and at most retail pharmacies    · If you are allergic or sensitive to soaps containing chlorhexidine gluconate (CHG), please let your doctor know so another antiseptic soap can be suggested  · CHG antiseptic soap is for external use only  2      The day before your operation follow these directions carefully to get ready  · Place clean lines (sheets) on your bed; you should sleep on clean sheets after your evening shower  · Get clean towels and washcloths ready - you need enough for 2 showers  · Set aside clean underwear, pajamas, and clothing to wear after the shower  Reminders:  · DO NOT use any other soap or body rinse on your skin during or after the antiseptic showers  · DO NOT use lotion , powder, deodorant, or perfume/aftershave of any kind on your skin after your antiseptic shower  · DO NOT shave any body parts in the 24 hours/the day before your operation  · DO NOT get the antiseptic soap in your eyes, ears, nose, mouth, or vaginal area  3      You will need to shower the night before AND the morning of your Surgery  Shower 1:  · The evening before your operation, take the fist shower  · First, shampoo your hair with regular shampoo and rinse it completely before you use the anitseptic soap  After washing and rinsing your hair, rinse your body  · Next, use a clean wash cloth to apply the antiseptic soap and wash your body from the neck down to your toes using 1/2 bottle of the antiseptic soap  You will use the other 1/2 bottle for the second shower  · Clean the area where your incision will be; later this area well for about 2 minutes  · If you ar having head or neck surgery, wash areas with the antiseptic soap  · Rinse yourself completely with running water  · Use a clean towel to dry off  · Wear clean underwear and clothing/pajamas  Shower 2:  · The Morning of your operation, take the second shower following the same steps as Shower 1 using the second 1/2 of the bottle of antiseptic soap    · Use clean cloths and towels to was and dry yourself off  · Wear clean underwear and clothing  You will receive a phone call from hospital for arrival time  Please call surgeons office if any changes in your condition  Wear easy on/off clothing; consider type of surgery;  valuables and jewelry please keep at home  Please secure transportation     Follow pre surgery showering or cleaning instructions as  Reviewed by nurse or surgeons office      Questions answered and concerns addressed

## 2020-01-03 ENCOUNTER — ANESTHESIA EVENT (OUTPATIENT)
Dept: PERIOP | Facility: HOSPITAL | Age: 60
End: 2020-01-03
Payer: COMMERCIAL

## 2020-01-03 ENCOUNTER — HOSPITAL ENCOUNTER (OUTPATIENT)
Facility: HOSPITAL | Age: 60
Setting detail: OUTPATIENT SURGERY
Discharge: HOME | End: 2020-01-03
Attending: NEUROLOGICAL SURGERY | Admitting: NEUROLOGICAL SURGERY
Payer: COMMERCIAL

## 2020-01-03 ENCOUNTER — ANESTHESIA (OUTPATIENT)
Dept: PERIOP | Facility: HOSPITAL | Age: 60
End: 2020-01-03
Payer: COMMERCIAL

## 2020-01-03 ENCOUNTER — APPOINTMENT (OUTPATIENT)
Dept: RADIOLOGY | Facility: HOSPITAL | Age: 60
End: 2020-01-03
Payer: COMMERCIAL

## 2020-01-03 VITALS
OXYGEN SATURATION: 95 % | TEMPERATURE: 98.1 F | WEIGHT: 169 LBS | DIASTOLIC BLOOD PRESSURE: 58 MMHG | HEART RATE: 66 BPM | BODY MASS INDEX: 25.61 KG/M2 | SYSTOLIC BLOOD PRESSURE: 113 MMHG | HEIGHT: 68 IN | RESPIRATION RATE: 18 BRPM

## 2020-01-03 PROCEDURE — C1820 GENERATOR NEURO RECHG BAT SY: HCPCS | Performed by: NEUROLOGICAL SURGERY

## 2020-01-03 PROCEDURE — 63685 INS/RPLC SPI NPG/RCVR POCKET: CPT | Performed by: PHYSICIAN ASSISTANT

## 2020-01-03 PROCEDURE — 63650 IMPLANT NEUROELECTRODES: CPT | Performed by: PHYSICIAN ASSISTANT

## 2020-01-03 PROCEDURE — C1787 PATIENT PROGR, NEUROSTIM: HCPCS | Performed by: NEUROLOGICAL SURGERY

## 2020-01-03 PROCEDURE — C1778 LEAD, NEUROSTIMULATOR: HCPCS | Performed by: NEUROLOGICAL SURGERY

## 2020-01-03 PROCEDURE — 63685 INS/RPLC SPI NPG/RCVR POCKET: CPT | Performed by: NEUROLOGICAL SURGERY

## 2020-01-03 PROCEDURE — 95972 ALYS CPLX SP/PN NPGT W/PRGRM: CPT | Performed by: NEUROLOGICAL SURGERY

## 2020-01-03 PROCEDURE — 72070 X-RAY EXAM THORAC SPINE 2VWS: CPT

## 2020-01-03 PROCEDURE — 63650 IMPLANT NEUROELECTRODES: CPT | Performed by: NEUROLOGICAL SURGERY

## 2020-01-03 DEVICE — LEAD 977A260 VECTRIS MRICS SUBCOMPACT
Type: IMPLANTABLE DEVICE | Site: SPINE THORACIC | Status: FUNCTIONAL
Brand: VECTRIS™ SURESCAN®

## 2020-01-03 DEVICE — INS 97715 INTELLIS SENSOR MRI US EMANUAL
Type: IMPLANTABLE DEVICE | Site: BUTTOCKS | Status: FUNCTIONAL
Brand: INTELLIS™ ADAPTIVESTIM®

## 2020-01-03 RX ORDER — PROPOFOL 10 MG/ML
INJECTION, EMULSION INTRAVENOUS CONTINUOUS PRN
Status: DISCONTINUED | OUTPATIENT
Start: 2020-01-03 | End: 2020-01-03 | Stop reason: SURG

## 2020-01-03 RX ORDER — BUPIVACAINE HYDROCHLORIDE 2.5 MG/ML
INJECTION, SOLUTION EPIDURAL; INFILTRATION; INTRACAUDAL AS NEEDED
Status: DISCONTINUED | OUTPATIENT
Start: 2020-01-03 | End: 2020-01-03 | Stop reason: HOSPADM

## 2020-01-03 RX ORDER — ONDANSETRON 2 MG/ML
INJECTION INTRAMUSCULAR; INTRAVENOUS AS NEEDED
Status: DISCONTINUED | OUTPATIENT
Start: 2020-01-03 | End: 2020-01-03 | Stop reason: SURG

## 2020-01-03 RX ORDER — CHLORHEXIDINE GLUCONATE 0.12 MG/ML
15 RINSE ORAL ONCE
Status: COMPLETED | OUTPATIENT
Start: 2020-01-03 | End: 2020-01-03

## 2020-01-03 RX ORDER — DEXAMETHASONE SODIUM PHOSPHATE 10 MG/ML
INJECTION, SOLUTION INTRAMUSCULAR; INTRAVENOUS AS NEEDED
Status: DISCONTINUED | OUTPATIENT
Start: 2020-01-03 | End: 2020-01-03 | Stop reason: SURG

## 2020-01-03 RX ORDER — FENTANYL CITRATE/PF 50 MCG/ML
25 SYRINGE (ML) INJECTION
Status: COMPLETED | OUTPATIENT
Start: 2020-01-03 | End: 2020-01-03

## 2020-01-03 RX ORDER — PROPOFOL 10 MG/ML
INJECTION, EMULSION INTRAVENOUS AS NEEDED
Status: DISCONTINUED | OUTPATIENT
Start: 2020-01-03 | End: 2020-01-03 | Stop reason: SURG

## 2020-01-03 RX ORDER — MIDAZOLAM HYDROCHLORIDE 2 MG/2ML
INJECTION, SOLUTION INTRAMUSCULAR; INTRAVENOUS AS NEEDED
Status: DISCONTINUED | OUTPATIENT
Start: 2020-01-03 | End: 2020-01-03 | Stop reason: SURG

## 2020-01-03 RX ORDER — SUCCINYLCHOLINE/SOD CL,ISO/PF 100 MG/5ML
SYRINGE (ML) INTRAVENOUS AS NEEDED
Status: DISCONTINUED | OUTPATIENT
Start: 2020-01-03 | End: 2020-01-03 | Stop reason: SURG

## 2020-01-03 RX ORDER — VANCOMYCIN HYDROCHLORIDE 1 G/200ML
1000 INJECTION, SOLUTION INTRAVENOUS ONCE
Status: COMPLETED | OUTPATIENT
Start: 2020-01-03 | End: 2020-01-03

## 2020-01-03 RX ORDER — SODIUM CHLORIDE, SODIUM LACTATE, POTASSIUM CHLORIDE, CALCIUM CHLORIDE 600; 310; 30; 20 MG/100ML; MG/100ML; MG/100ML; MG/100ML
75 INJECTION, SOLUTION INTRAVENOUS CONTINUOUS
Status: DISCONTINUED | OUTPATIENT
Start: 2020-01-03 | End: 2020-01-03 | Stop reason: HOSPADM

## 2020-01-03 RX ORDER — ROCURONIUM BROMIDE 10 MG/ML
INJECTION, SOLUTION INTRAVENOUS AS NEEDED
Status: DISCONTINUED | OUTPATIENT
Start: 2020-01-03 | End: 2020-01-03 | Stop reason: SURG

## 2020-01-03 RX ORDER — OXYCODONE HYDROCHLORIDE AND ACETAMINOPHEN 5; 325 MG/1; MG/1
1 TABLET ORAL EVERY 4 HOURS PRN
Status: DISCONTINUED | OUTPATIENT
Start: 2020-01-03 | End: 2020-01-03 | Stop reason: HOSPADM

## 2020-01-03 RX ORDER — LIDOCAINE HYDROCHLORIDE AND EPINEPHRINE 10; 10 MG/ML; UG/ML
INJECTION, SOLUTION INFILTRATION; PERINEURAL AS NEEDED
Status: DISCONTINUED | OUTPATIENT
Start: 2020-01-03 | End: 2020-01-03 | Stop reason: HOSPADM

## 2020-01-03 RX ORDER — FENTANYL CITRATE 50 UG/ML
INJECTION, SOLUTION INTRAMUSCULAR; INTRAVENOUS AS NEEDED
Status: DISCONTINUED | OUTPATIENT
Start: 2020-01-03 | End: 2020-01-03 | Stop reason: SURG

## 2020-01-03 RX ORDER — METOCLOPRAMIDE HYDROCHLORIDE 5 MG/ML
10 INJECTION INTRAMUSCULAR; INTRAVENOUS ONCE AS NEEDED
Status: DISCONTINUED | OUTPATIENT
Start: 2020-01-03 | End: 2020-01-03 | Stop reason: HOSPADM

## 2020-01-03 RX ADMIN — SODIUM CHLORIDE, SODIUM LACTATE, POTASSIUM CHLORIDE, AND CALCIUM CHLORIDE 75 ML/HR: .6; .31; .03; .02 INJECTION, SOLUTION INTRAVENOUS at 10:26

## 2020-01-03 RX ADMIN — Medication 100 MG: at 11:51

## 2020-01-03 RX ADMIN — FENTANYL CITRATE 25 MCG: 50 INJECTION, SOLUTION INTRAMUSCULAR; INTRAVENOUS at 13:29

## 2020-01-03 RX ADMIN — FENTANYL CITRATE 25 MCG: 50 INJECTION, SOLUTION INTRAMUSCULAR; INTRAVENOUS at 13:24

## 2020-01-03 RX ADMIN — ROCURONIUM BROMIDE 5 MG: 10 INJECTION, SOLUTION INTRAVENOUS at 11:51

## 2020-01-03 RX ADMIN — MIDAZOLAM 2 MG: 1 INJECTION INTRAMUSCULAR; INTRAVENOUS at 11:43

## 2020-01-03 RX ADMIN — VANCOMYCIN HYDROCHLORIDE 1000 MG: 1 INJECTION, SOLUTION INTRAVENOUS at 10:41

## 2020-01-03 RX ADMIN — FENTANYL CITRATE 50 MCG: 50 INJECTION, SOLUTION INTRAMUSCULAR; INTRAVENOUS at 12:31

## 2020-01-03 RX ADMIN — PROPOFOL 80 MCG/KG/MIN: 10 INJECTION, EMULSION INTRAVENOUS at 11:58

## 2020-01-03 RX ADMIN — ONDANSETRON 4 MG: 2 INJECTION INTRAMUSCULAR; INTRAVENOUS at 12:32

## 2020-01-03 RX ADMIN — FENTANYL CITRATE 25 MCG: 50 INJECTION, SOLUTION INTRAMUSCULAR; INTRAVENOUS at 13:19

## 2020-01-03 RX ADMIN — DEXAMETHASONE SODIUM PHOSPHATE 10 MG: 10 INJECTION, SOLUTION INTRAMUSCULAR; INTRAVENOUS at 11:58

## 2020-01-03 RX ADMIN — PROPOFOL 200 MG: 10 INJECTION, EMULSION INTRAVENOUS at 11:49

## 2020-01-03 RX ADMIN — FENTANYL CITRATE 25 MCG: 50 INJECTION, SOLUTION INTRAMUSCULAR; INTRAVENOUS at 13:44

## 2020-01-03 RX ADMIN — CHLORHEXIDINE GLUCONATE 0.12% ORAL RINSE 15 ML: 1.2 LIQUID ORAL at 10:19

## 2020-01-03 RX ADMIN — FENTANYL CITRATE 25 MCG: 50 INJECTION, SOLUTION INTRAMUSCULAR; INTRAVENOUS at 13:08

## 2020-01-03 RX ADMIN — FENTANYL CITRATE 25 MCG: 50 INJECTION, SOLUTION INTRAMUSCULAR; INTRAVENOUS at 13:14

## 2020-01-03 RX ADMIN — OXYCODONE HYDROCHLORIDE AND ACETAMINOPHEN 1 TABLET: 5; 325 TABLET ORAL at 14:38

## 2020-01-03 RX ADMIN — FENTANYL CITRATE 50 MCG: 50 INJECTION, SOLUTION INTRAMUSCULAR; INTRAVENOUS at 11:49

## 2020-01-03 NOTE — ANESTHESIA PREPROCEDURE EVALUATION
Review of Systems/Medical History          Cardiovascular   Pulmonary  COPD moderate- medication dependent , Asthma , well controlled/ stable ,        GI/Hepatic    GERD well controlled,  Hiatal hernia,             Endo/Other     GYN       Hematology   Musculoskeletal    Arthritis     Neurology   Psychology     Chronic opioid dependence Chronic pain,            Physical Exam    Airway    Mallampati score: II         Dental   upper dentures,     Cardiovascular  Rhythm: regular, Rate: normal, Cardiovascular exam normal    Pulmonary  Pulmonary exam normal Breath sounds clear to auscultation,     Other Findings        Anesthesia Plan  ASA Score- 2     Anesthesia Type- general with ASA Monitors  Additional Monitors:   Airway Plan: ETT  Plan Factors-    Induction- intravenous  Postoperative Plan- Plan for postoperative opioid use  Informed Consent- Anesthetic plan and risks discussed with patient  I personally reviewed this patient with the CRNA  Discussed and agreed on the Anesthesia Plan with the CRNA  Juanita Sarabia

## 2020-01-03 NOTE — DISCHARGE INSTRUCTIONS
Discharge Instructions  Spinal Cord Stimulator (SCS)    Activity:  1  Do not lift more than 10 pounds for 6 weeks  2  Avoid bending, lifting and twisting for 6 weeks  3  No strenuous activities  No driving for 2 weeks  4  When able to shower, continue to use clean towel and washcloth for 2 weeks post-op  5  Continue to change bed linens and pajamas more frequently  Wear clean clothes daily  Surgical incision care:  1  For Permanent SCS placement:  a  Keep incisions dry for 3 days  b  May shower with mild antimicrobial soap after 3 days  c  After 3 days, incisions may be left open to air, but must remain clean  2  Do not immerse the incisions in water for 6 weeks  3  Do not apply any creams or ointments to the incision for 6 weeks, unless otherwise instructed by [facility]  4  Contact office if increasing redness, drainage, pain or swelling around the incisions  Postoperative medication:  1  Complete course of antibiotic as directed  a  For permanent spinal cord stimulators: 3 days  2  [address] will provide pain medication as coordinated with your pain specialist  All prescriptions must come from a single provider  a  Take all medications as prescribed  Call office with any questions/concerns  3  Please contact office for questions regarding dosage and modifications  4  No antiplatelet, anticoagulation or Nonsteroidal anti-inflammatory (NSAIDs) medication until cleared by [address], unless otherwise instructed  5  Do not operate heavy machinery or vehicles while taking sedating medications  6  Use a bowel regimen while on opioids as they induce constipation  Ie  Senokot-S, Miralax, Colace, etc  Increase fiber and water intake  ***Note that it may take some time for the stimulator to improve chronic pain symptoms  Adjustment of the stimulator program can begin 2 weeks after placement   For TRIALS, there will be ongoing communication with the rep and adjustments as indicated  Please contact the stimulator representative if you have any questions regarding programing  ***

## 2020-01-03 NOTE — ANESTHESIA POSTPROCEDURE EVALUATION
Post-Op Assessment Note    CV Status:  Stable  Pain Score: 0    Pain management: adequate     Mental Status:  Alert and awake   Hydration Status:  Euvolemic   PONV Controlled:  Controlled   Airway Patency:  Patent   Post Op Vitals Reviewed: Yes      Staff: CRNA           /66 (01/03/20 1301)    Temp 98 °F (36 7 °C) (01/03/20 1301)    Pulse 66 (01/03/20 1301)   Resp 16 (01/03/20 1301)    SpO2

## 2020-01-03 NOTE — OP NOTE
OPERATIVE REPORT  PATIENT NAME: Selena Fuller    :  1960  MRN: [id number]  Pt Location: [facility] OR ROOM 02    SURGERY DATE: 1/3/2020    Surgeon(s) and Role:     * Celeste Ramirez MD - Primary     * Maris Charlton PA-C - Assisting    Preop Diagnosis:  Chronic pain syndrome [G89 4]  Lumbar radiculopathy [M54 16]    Post-Op Diagnosis Codes:     * Chronic pain syndrome [G89 4]     * Lumbar radiculopathy [M54 16]    Procedure(s) (LRB):  INSERTION THORACIC DORSAL COLUMN SPINAL CORD STIMULATOR PERCUTANEOUS, RIGHT GENERATOR (Right)    Specimen(s):  * No specimens in log *    Estimated Blood Loss:   Minimal    Drains:  * No LDAs found *    Anesthesia Type:   General    Operative Indications:  Chronic pain syndrome [G89 4]  Lumbar radiculopathy [M54 16]      Operative Findings:  See dictated note  Koding SN: [id number]  [id number] lead x2    Complications:   None    Procedure and Technique:  The patient was taken to operative theater induced under general anesthesia and intubated she was positioned prone a Maykel frame  The sweet spot during the trial was planned at T8-10  A right buttock incision was planned for the generator she was prepped and draped in sterile fashion  We used an epidmed needle to gain epidural access via loss of resistance technique  We traveled two electrodes through two different epidural access needles up to towards approximately T8 and mid T8 on the left and right  We confirmed placement of the the lead based on fluoroscopy  We tested the EMG response by altering the following parameters using a neurostimulation device   We programmed the following:    Frequency: 10-60 hertz   Pulse width of 350 us  Amplitudes: 0-10 mA   Contacts: midline contacts alternating contacts on the 16 contact electrode  Configuration: Bipolar with (+) and (-)  Cycling: None  Waveform: Tonic     We obtained the appropriate EMG response on the right and left legs      The pocket on the right buttock was created with an knife and electrocautery  We tunneled that electrodes connected to the impulse generator and this was connected  We then tested impedances which were normal     The leads been anchored down using anchors under serial of fluoroscopy  After all hardware was in place we irrigated each wound  And then closed in layers using 2 0 Vicryl for the subcutaneous tissues and monocryl for the skin sterile dressing was placed  Patient was repositioned supine the hospital bed extubated to room air  monocryl was taken to the postop recovery area stable condition  All needle and sponge counts were correct at the procedure  All neuromonitoring remained stable during the procedure       I was present for the entire procedure, A qualified resident physician was not available and A physician assistant was required during the procedure for retraction tissue handling,dissection and suturing    Patient Disposition:  PACU     SIGNATURE: Socorro Marie MD  DATE: January 3, 2020  TIME: 12:36 PM

## 2020-01-03 NOTE — INTERVAL H&P NOTE
H&P reviewed  After examining the patient I find no changes in the patients condition since the H&P had been written      Vitals:    01/03/20 1016   BP: 115/74   Pulse: 69   Resp: 18   Temp: 98 2 °F (36 8 °C)   SpO2: 97%

## 2020-01-06 ENCOUNTER — DOCUMENTATION (OUTPATIENT)
Dept: NEUROSURGERY | Facility: CLINIC | Age: 60
End: 2020-01-06

## 2020-01-06 ENCOUNTER — TELEPHONE (OUTPATIENT)
Dept: NEUROSURGERY | Facility: CLINIC | Age: 60
End: 2020-01-06

## 2020-01-06 NOTE — TELEPHONE ENCOUNTER
Pt reports she is experiencing a lot of post-op pain from SCS and battery placement, and doesn't understand why  Explained she is only 3 days post and can expect discomfort, she had surgery  Discussed if she is taking a muscle relaxer and she reports she is but just got a refill  Discussed with Cristel CONLEY and informed pt we would not be changing her pain med  Encouraged her to suppliment with tylenol in between, but to avoid taking more than 300 mg/day  This would be determined on how many Percocet 5/325 she takes  She stated an understanding  She reports d/c instructions were reviewed and provided at hospital and she offers no questions re these  She is aware of f/u appts and was encouraged to call with future questions or concerns  Pt reports the need to conclude conversation

## 2020-01-20 ENCOUNTER — CLINICAL SUPPORT (OUTPATIENT)
Dept: NEUROSURGERY | Facility: CLINIC | Age: 60
End: 2020-01-20

## 2020-01-20 VITALS — SYSTOLIC BLOOD PRESSURE: 126 MMHG | TEMPERATURE: 98.5 F | DIASTOLIC BLOOD PRESSURE: 86 MMHG

## 2020-01-20 DIAGNOSIS — Z98.890 POST-OPERATIVE STATE: Primary | ICD-10-CM

## 2020-01-20 PROCEDURE — 99024 POSTOP FOLLOW-UP VISIT: CPT

## 2020-01-20 NOTE — PROGRESS NOTES
Post-Op Visit-Neurosurgery    [facility] 61 y o  female MRN: [id number]    Chief Complaint:  Patient presents post: [address], RIGHT GENERATOR (Right Spine Thoracic)    History of Present Illness:  Patient presents for 2 week POV for incision check  Arrived accompanied by her friend who waited in the waiting room  She ambulated well without assistive device  Reports pain is 8/10 but has only been taking tylenol for pain at this time  Is anxious to do programming today  She reports completing her entire antibiotic, which she believes she developed a UTI from       Current Outpatient Medications:     albuterol (2 5 mg/3 mL) 0 083 % nebulizer solution, Inhale 2 5 mg 2 (two) times a day , Disp: , Rfl:     Ascorbic Acid (VITAMIN C) 1000 MG tablet, Take by mouth daily , Disp: , Rfl:     esomeprazole (NEXIUM) 40 mg packet, Take 40 mg by mouth daily in the early morning , Disp: , Rfl:     Fish Oil-Cholecalciferol (FISH OIL + D3) 0666-8971 MG-UNIT CAPS, Take by mouth, Disp: , Rfl:     fluticasone-salmeterol (ADVAIR DISKUS) 250-50 mcg/dose inhaler, Inhale 1 puff 2 (two) times a day , Disp: , Rfl:     gabapentin (NEURONTIN) 300 mg capsule, Take 2 capsules by mouth 3 (three) times a day as needed , Disp: , Rfl:     montelukast (SINGULAIR) 10 mg tablet, Take 10 mg by mouth daily at bedtime , Disp: , Rfl:     polyethylene glycol-propylene glycol (SYSTANE) 0 4-0 3 %, Apply 2 drops to eye daily , Disp: , Rfl:     pregabalin (LYRICA) 75 mg capsule, Take 75 mg by mouth 2 (two) times a day , Disp: , Rfl:     tiotropium (SPIRIVA RESPIMAT) 2 5 MCG/ACT AERS inhaler, Inhale 2 puffs 2 (two) times a day, Disp: , Rfl:     tiZANidine (ZANAFLEX) 4 mg tablet, Take 4 mg by mouth 2 (two) times a day , Disp: , Rfl:     oxyCODONE-acetaminophen (PERCOCET) 5-325 mg per tablet, Take 1 tablet by mouth every 4 (four) hours as needed for moderate painMax Daily Amount: 6 tablets (Patient not taking: Reported on 1/20/2020), Disp: 30 tablet, Rfl: 0   Allergies   Allergen Reactions    Lactose Intolerance (Gi) GI Intolerance    Shellfish Allergy Vomiting    Amoxicillin Rash    Penicillins Itching and Rash          Assessment:    Vitals:    01/20/20 1305   BP: 126/86   BP Location: Right arm   Patient Position: Sitting   Temp: 98 5 °F (36 9 °C)   TempSrc: Tympanic      Pain Score:   8     Wound Exam: Incisions are clean, dry, and in tact, well approximated, without heat, redness, swelling or drainage  See images below:             Procedure:  Surgical site assessment  Removed excess surgical glue and left JUSTYNA  Discussion/Summary  Reviewed incision care with patient including daily observation for s/s infection including: increased erythema, edema, drainage, dehiscence of incision or fever >101  Should these be observed, she understands that she is to call and/or return immediately for reassessment  Advised patient to continue cleansing area with mild soap and water and pat dry  Not to apply any lotions, creams, or ointments, & not to submerge in any water for 4  more weeks  She is to maintain activity restrictions until cleared by the surgeon  Activity levels were also reviewed with the patient in detail, she is to lift no greater than 10 pounds, advised to limit bend/twisting at the waist and ambulation is encouraged as tolerated  Verified date/time/location of upcoming POV on 2/24/2020  She is to call the office with any further questions or concerns, or if any incisional issues or fevers would arise  Contacted [employer] to determine if they would be present for her visit today  Spoke with Jessica who states the patient was not on their schedule and there was nobody in the area to come for her visit today   She assured me the patient would be contacted and arrange for meeting with rep closer to her home location for programming this week  Also discussed with Dipti GERARD who sent email to the reps as well as Dr Lydia Nails advising of how upset the patient was about them not being present today

## 2020-02-24 ENCOUNTER — OFFICE VISIT (OUTPATIENT)
Dept: NEUROSURGERY | Facility: CLINIC | Age: 60
End: 2020-02-24

## 2020-02-24 VITALS
HEART RATE: 71 BPM | BODY MASS INDEX: 25.61 KG/M2 | HEIGHT: 68 IN | WEIGHT: 169 LBS | RESPIRATION RATE: 16 BRPM | TEMPERATURE: 98.3 F | SYSTOLIC BLOOD PRESSURE: 140 MMHG | DIASTOLIC BLOOD PRESSURE: 80 MMHG

## 2020-02-24 DIAGNOSIS — Z48.89 AFTERCARE FOLLOWING SURGERY: Primary | ICD-10-CM

## 2020-02-24 PROCEDURE — 99024 POSTOP FOLLOW-UP VISIT: CPT | Performed by: NEUROLOGICAL SURGERY

## 2020-02-24 NOTE — PROGRESS NOTES
Assessment/Plan:    No problem-specific Assessment & Plan notes found for this encounter  6 weeks postop from a spinal cord stimulator placement  She has some mild complaints of generator site pain and some persistent chronic pain symptoms  We will reprogram today  If she has persistent issues she can follow-up with us  She is off restrictions  F/u PRN     Diagnoses and all orders for this visit:    Aftercare following surgery          Subjective:      Patient ID: Tiffany Stevens is a 61 y o  female  HPI    6 weeks postop has persistent chronic pain that is residual she reports some implant related pain still  The following portions of the patient's history were reviewed and updated as appropriate: allergies, current medications, past family history, past medical history, past social history, past surgical history and problem list     Review of Systems   HENT: Positive for trouble swallowing (Hx of stretching)  Eyes: Negative  Respiratory: Positive for shortness of breath  Negative for wheezing  COPD  asthma   Cardiovascular: Negative  Gastrointestinal: Negative  Negative for abdominal pain and nausea  Musculoskeletal: Positive for arthralgias, back pain (right side with LBP into right hip wraps into groin, with pain traveling to right outer side to the knee) and gait problem  Negative for neck pain and neck stiffness  SCS is helping, may need adjustments   Neurological: Positive for weakness (right leg)  Objective:      /80 (BP Location: Left arm)   Pulse 71   Temp 98 3 °F (36 8 °C) (Tympanic)   Resp 16   Ht 5' 8" (1 727 m)   Wt 76 7 kg (169 lb)   BMI 25 70 kg/m²          Physical Exam   Constitutional: She is oriented to person, place, and time  She appears well-developed  HENT:   Head: Normocephalic and atraumatic  Pulmonary/Chest: Effort normal    Neurological: She is alert and oriented to person, place, and time       incisions well healed

## 2020-02-25 ENCOUNTER — DOCUMENTATION (OUTPATIENT)
Dept: NEUROSURGERY | Facility: CLINIC | Age: 60
End: 2020-02-25

## 2020-07-16 ENCOUNTER — TELEPHONE (OUTPATIENT)
Dept: RADIOLOGY | Facility: CLINIC | Age: 60
End: 2020-07-16

## 2020-07-16 NOTE — TELEPHONE ENCOUNTER
Spoke to pt, Jessica from [employer] is going to meet the pt on Tuesday 07/21/20 at the [facility] in [facility] [place] to save her a trip down here, and then Jessica will let her know if she needs to make an appt after that  Advised pt that appt, should she need it, would be with Dr Eddi Haynes as opposed to Dr Marissa Gyoal  Pt appreciative of call

## 2020-07-16 NOTE — TELEPHONE ENCOUNTER
Pt called, had SCS trial in November of 2019 and permanent implanted on 01/03/20 by Dr Nathalia Bueno  She states she is having problems with the placement of her battery and that it may be dead  She says the [employer] rep told her she needs to make an appointment for an OV  Would we make the appt here at [facility] or does pt need OV with neurosurgery?

## 2024-04-22 NOTE — ADDENDUM NOTE
- Will increase Lexapro to 20 mg PO daily due to worsening depressive symptoms. Rx provided today.    Addended byRonal Flannery on: 1/2/2020 06:05 PM     Modules accepted: Orders

## (undated) DEVICE — DRAPE C-ARMOUR

## (undated) DEVICE — RX COUDÉ® EPIDURAL NEEDLE 14G TW X 4.0": Brand: EPIMED

## (undated) DEVICE — TIBURON SPLIT SHEET: Brand: CONVERTORS

## (undated) DEVICE — SYRINGE EPI 8ML LUER SLIP LOSS OF RESISTANCE PLASTIC PERFIX

## (undated) DEVICE — BETHLEHEM UNIVERSAL MINOR GEN: Brand: CARDINAL HEALTH

## (undated) DEVICE — PENCIL ELECTROSURG E-Z CLEAN -0035H

## (undated) DEVICE — UTILITY MARKER,BLACK WITH LABELS: Brand: DEVON

## (undated) DEVICE — SPONGE SCRUB 4 PCT CHLORHEXIDINE

## (undated) DEVICE — RECHARGER PRGRMR THERAPHY MANAGER RS2

## (undated) DEVICE — VIAL DECANTER

## (undated) DEVICE — GLOVE INDICATOR PI UNDERGLOVE SZ 6.5 BLUE

## (undated) DEVICE — ELECTRODE BLADE MOD E-Z CLEAN 2.5IN 6.4CM -0012M

## (undated) DEVICE — ANTIBACTERIAL VIOLET BRAIDED (POLYGLACTIN 910), SYNTHETIC ABSORBABLE SUTURE: Brand: COATED VICRYL

## (undated) DEVICE — GLOVE SRG BIOGEL ECLIPSE 7

## (undated) DEVICE — GLOVE SRG BIOGEL 6.5

## (undated) DEVICE — NEEDLE HYPO 22G X 1-1/2 IN

## (undated) DEVICE — DRAPE C-ARM X-RAY

## (undated) DEVICE — Device

## (undated) DEVICE — PROGRAMMER PATIENT THERAPHY MANAGER RS2

## (undated) DEVICE — SUT SILK 2-0 SH 30 IN K833H

## (undated) DEVICE — INTENDED FOR TISSUE SEPARATION, AND OTHER PROCEDURES THAT REQUIRE A SHARP SURGICAL BLADE TO PUNCTURE OR CUT.: Brand: BARD-PARKER SAFETY BLADES SIZE 10, STERILE

## (undated) DEVICE — CHLORAPREP HI-LITE 26ML ORANGE

## (undated) DEVICE — SUT MONOCRYL 4-0 PS-2 27 IN Y426H

## (undated) DEVICE — ADHESIVE SKIN HIGH VISCOSITY EXOFIN 1ML

## (undated) DEVICE — GLOVE INDICATOR PI UNDERGLOVE SZ 7.5 BLUE

## (undated) DEVICE — PROGRAMER EXTERNAL WIRELESS NEURO STIM RS2